# Patient Record
Sex: MALE | Race: WHITE | NOT HISPANIC OR LATINO | Employment: OTHER | URBAN - NONMETROPOLITAN AREA
[De-identification: names, ages, dates, MRNs, and addresses within clinical notes are randomized per-mention and may not be internally consistent; named-entity substitution may affect disease eponyms.]

---

## 2017-05-25 ENCOUNTER — TRANSCRIBE ORDERS (OUTPATIENT)
Dept: GENERAL RADIOLOGY | Facility: HOSPITAL | Age: 27
End: 2017-05-25

## 2017-05-25 DIAGNOSIS — R13.10 DYSPHAGIA, UNSPECIFIED TYPE: Primary | ICD-10-CM

## 2017-06-01 ENCOUNTER — TRANSCRIBE ORDERS (OUTPATIENT)
Dept: GENERAL RADIOLOGY | Facility: HOSPITAL | Age: 27
End: 2017-06-01

## 2017-06-08 ENCOUNTER — LAB REQUISITION (OUTPATIENT)
Dept: LAB | Facility: HOSPITAL | Age: 27
End: 2017-06-08

## 2017-06-08 DIAGNOSIS — N39.0 URINARY TRACT INFECTION: ICD-10-CM

## 2017-06-08 LAB
BACTERIA UR QL AUTO: ABNORMAL /HPF
BILIRUB UR QL STRIP: NEGATIVE
CLARITY UR: CLEAR
COLOR UR: YELLOW
GLUCOSE UR STRIP-MCNC: ABNORMAL MG/DL
HGB UR QL STRIP.AUTO: NEGATIVE
HYALINE CASTS UR QL AUTO: ABNORMAL /LPF
KETONES UR QL STRIP: ABNORMAL
LEUKOCYTE ESTERASE UR QL STRIP.AUTO: NEGATIVE
NITRITE UR QL STRIP: NEGATIVE
PH UR STRIP.AUTO: 7 [PH] (ref 5–8)
PROT UR QL STRIP: NEGATIVE
RBC # UR: ABNORMAL /HPF
REF LAB TEST METHOD: ABNORMAL
SP GR UR STRIP: 1.01 (ref 1–1.03)
SQUAMOUS #/AREA URNS HPF: ABNORMAL /HPF
UROBILINOGEN UR QL STRIP: ABNORMAL
WBC UR QL AUTO: ABNORMAL /HPF

## 2017-06-08 PROCEDURE — 81001 URINALYSIS AUTO W/SCOPE: CPT | Performed by: INTERNAL MEDICINE

## 2017-06-08 PROCEDURE — 87086 URINE CULTURE/COLONY COUNT: CPT | Performed by: INTERNAL MEDICINE

## 2017-06-10 LAB — BACTERIA SPEC AEROBE CULT: NO GROWTH

## 2017-06-19 ENCOUNTER — HOSPITAL ENCOUNTER (OUTPATIENT)
Dept: GENERAL RADIOLOGY | Facility: HOSPITAL | Age: 27
Discharge: HOME | End: 2017-06-19
Admitting: INTERNAL MEDICINE

## 2017-06-19 ENCOUNTER — HOSPITAL ENCOUNTER (EMERGENCY)
Facility: HOSPITAL | Age: 27
Discharge: HOME | End: 2017-06-19
Attending: EMERGENCY MEDICINE | Admitting: EMERGENCY MEDICINE

## 2017-06-19 VITALS
SYSTOLIC BLOOD PRESSURE: 125 MMHG | DIASTOLIC BLOOD PRESSURE: 80 MMHG | RESPIRATION RATE: 18 BRPM | WEIGHT: 128 LBS | HEIGHT: 71 IN | OXYGEN SATURATION: 96 % | HEART RATE: 101 BPM | TEMPERATURE: 98.1 F | BODY MASS INDEX: 17.92 KG/M2

## 2017-06-19 DIAGNOSIS — K40.90 UNILATERAL INGUINAL HERNIA WITHOUT OBSTRUCTION OR GANGRENE, RECURRENCE NOT SPECIFIED: ICD-10-CM

## 2017-06-19 DIAGNOSIS — R13.10 DYSPHAGIA, UNSPECIFIED TYPE: ICD-10-CM

## 2017-06-19 DIAGNOSIS — L02.92 FURUNCLE: Primary | ICD-10-CM

## 2017-06-19 PROCEDURE — 74230 X-RAY XM SWLNG FUNCJ C+: CPT

## 2017-06-19 PROCEDURE — 92611 MOTION FLUOROSCOPY/SWALLOW: CPT

## 2017-06-19 PROCEDURE — 99283 EMERGENCY DEPT VISIT LOW MDM: CPT

## 2017-06-19 RX ORDER — CITALOPRAM 20 MG/1
20 TABLET ORAL DAILY
COMMUNITY
End: 2019-08-07 | Stop reason: SDUPTHER

## 2017-06-19 RX ORDER — BACLOFEN 20 MG/1
20 TABLET ORAL 2 TIMES DAILY
COMMUNITY
End: 2017-06-22

## 2017-06-19 RX ORDER — CHOLECALCIFEROL (VITAMIN D3) 125 MCG
5 CAPSULE ORAL NIGHTLY
COMMUNITY
End: 2019-06-11

## 2017-06-19 RX ORDER — DANTROLENE SODIUM 100 MG/1
25 CAPSULE ORAL 2 TIMES DAILY
COMMUNITY
End: 2019-08-07

## 2017-06-19 RX ORDER — PROPRANOLOL HYDROCHLORIDE 20 MG/1
20 TABLET ORAL 3 TIMES DAILY
COMMUNITY
End: 2019-08-07 | Stop reason: SDUPTHER

## 2017-06-19 RX ORDER — GABAPENTIN 100 MG/1
100 CAPSULE ORAL 3 TIMES DAILY
COMMUNITY
End: 2017-07-20 | Stop reason: SDUPTHER

## 2017-06-19 NOTE — ED PROVIDER NOTES
Subjective   History of Present Illness   26M w/ prior anoxic brain injury on G-tube, speech difficulty bib his brother who found wound on L-central chest wall w/ mild erythema and drainage of white discharge. Denies fever, chills, n/v, hx of MRSA.     Brother also worried about swelling above R testicle found incidentally in shower. Normal bowel movements.    Review of Systems   Skin: Positive for wound.   All other systems reviewed and are negative.      Past Medical History:   Diagnosis Date   • Diabetes mellitus    • G tube feedings        Allergies   Allergen Reactions   • Penicillins Other (See Comments)       Past Surgical History:   Procedure Laterality Date   • KNEE SURGERY      RIGHT       History reviewed. No pertinent family history.    Social History     Social History   • Marital status: Single     Spouse name: N/A   • Number of children: N/A   • Years of education: N/A     Social History Main Topics   • Smoking status: Never Smoker   • Smokeless tobacco: None   • Alcohol use No   • Drug use: No   • Sexual activity: Not Asked     Other Topics Concern   • None     Social History Narrative   • None           Objective   Physical Exam   Constitutional: He appears well-developed. No distress.   Thin appearing   HENT:   Head: Normocephalic.   Mouth/Throat: Oropharynx is clear and moist.   Eyes: No scleral icterus.   Neck: Neck supple. No tracheal deviation present.   Cardiovascular: Normal rate, regular rhythm, normal heart sounds and intact distal pulses.  Exam reveals no gallop and no friction rub.    No murmur heard.  Pulmonary/Chest: Effort normal and breath sounds normal. No stridor. No respiratory distress. He has no wheezes. He has no rales. He exhibits no tenderness.   Abdominal: Soft. He exhibits no distension and no mass. There is no tenderness. There is no rebound and no guarding. A hernia (R-inguinal, easily reducible) is present.   G-tube in place LUQ   Musculoskeletal: Normal range of motion.  He exhibits no deformity.   Neurological: He is alert.   Mute during my interview   Skin: Skin is warm and dry. No rash noted. He is not diaphoretic. No erythema. No pallor.   2mm diameter wound L-central chest w/ drainage of pus. Minimal surrounding erythema. No induration. Pus expressed from wound at bedside.    Nursing note and vitals reviewed.      Procedures         ED Course  ED Course                  MDM   26-year-old male here with what appears to be a furuncle on the left anterior chest wall.  Drained at bedside.  Also with right inguinal hernia easily reducible.  Discussed the risks and benefits of antibiotic use for this forearm call, however, given that patient does have a history of C. difficile and has no risk factors for MRSA at the time, will defer this at the patient's brother's request.  Discussed the risks of possible further infection and signs and symptoms of this.  Also discussed the signs and symptoms of complications from his hernia and the importance of follow-up with a surgeon to discuss possible repair.  We'll discharge home with strict return to care precautions and recommendation to soak this wound twice daily.    Final diagnoses:   Furuncle   Unilateral inguinal hernia without obstruction or gangrene, recurrence not specified            Edis Castañeda MD  06/19/17 1499

## 2017-06-19 NOTE — MBS/VFSS/FEES
Outpatient Speech Language Pathology   Adult Swallow Modified Barium Swallow Study    [facility]     Patient Name: Stephen Delgado  : 1990  MRN: [id number]  Today's Date: 2017         Visit Date: 2017   There is no problem list on file for this patient.       Past Medical History:   Diagnosis Date   • Diabetes mellitus    • G tube feedings         Past Surgical History:   Procedure Laterality Date   • KNEE SURGERY      RIGHT         Visit Dx:     ICD-10-CM ICD-9-CM   1. Dysphagia, unspecified type R13.10 787.20                   Adult Dysphagia - 17 1005     Adult Dysphagia Background    Patient Description of Complaint unable to report; pt.'s brother reported exam recommended to see if pt. safe for diet upgragde from PEG as sole means of nutrition/hydration  -TM    Date of Onset 2017  -TM    Symptoms Reported by Patient Other (comment)   significant medical history affecting swallow safety  -TM    Patient Report of Functional Impact pt.'s brother reported that pt. has interest in po intake  -TM    History Pertinent to Diagnosis diabetic coma in ) per pt.'s brother   -TM    Date of Onset 2017  -TM    Diet Level (Liquids) Water/ice between meals   small amounts  -TM    Non-Oral Feeding Gastrostomy Tube  -TM    Oral Mech    Position During Evaluation Upright  -TM    Anatomy/Physiology WNL Patient demonstrates anatomy that is WNL   significant oral impairment of jaw, lips, tongue  -TM    Dentition Patient has own teeth  -TM    Oral Health Oral cavity is clean  -TM    Awareness/Control of Secretions Patient swallows saliva  -TM    Foods/Liquids Presented    Method of Administration Fed by examiner  -TM    Fed By Examiner    Consistency Thin;Honey;Pudding  -TM    Response Other (comment)   coughing prior, during, & after po trials  -TM    Strategies Attempted Chin Tuck   ineffective due to cognitive deficits  -TM    Response to Strategies Unsuccessful  -TM    Oral Phase  Impaired Physiology Observed    Oral Phase Other (comment)   decreased strength jaw, lips, & tongue  -TM    Pharyngeal Symptoms --   decreased laryngeal elev., tongue base; deep penet. to vf's  -TM    Summary Comments of Clinical Exam decreased jaw strenth, labial strength, and lingual strength & ROM affecting bolus prep, bolus initiation, bolus transit time, and bolus clearance (resulting in residue post swallows) from oral cavity; decreased laryngeal elevation w/mary kate aspiration during the swallow and pyriform stasis post swallows w/all consistencies trialed, decreased tongue base retraction w/moderate stasis post swallows in valleculae w/all consistencies, and aspiration post swallow w/honey-thick due to loss of stasis from valleculae.  He was unable to clear penetrated liq from the laryngeal vestibule due to weakness of cough.  Pt. unsafe for continued trials.  Suspect aspiration from secretions as pt. exhibited frequent cough prior to po trials and throughout exam.  Pt. recommended to be NPO - unsafe for any po intake at this time.  Pt. should continue w/speech therapy for strengthening for potential for po intake.   -TM    VFSS Exam    Study Completed By SLP and Radiology PA (comment)  -TM    Textures Presented Thin;Honey;Pudding  -TM    Position During Study/Views Obtained Upright;Lateral View  -TM    Physiologic Impairments Noted on VFSS    Physiologic Impairments Noted on VFSS Did not assess esophageal phase;Other (comment)   severe oral and pharyngeal phases dysphagia  -TM    Reduced Tongue Base Retraction all trials  -TM    Increased Stage Transition Duration (Delay) all trials  -TM    Reduced Laryngeal Elevation all trials  -TM    Reduced Pharyngeal Contraction all trials  -TM    Reduced Closure at Entrance to Airway honey and thin  -TM    Reduced Closure Level of Vocal Folds thin  -TM    Symptoms    Aspiration X   thin  -TM    Aspiration During With these consistencies (comment)   thin  -TM     Aspiration After With these consistencies (comment)   honey  -TM    Residue Noted X   all consistencies noted  -TM    Valleculae With these consistencies (comment)   thin, honey, pudding (all trialed)  -TM    Pyriform Sinuses With these consistencies (comment)   thin, honey, pudding (all trialed)  -TM    Esophageal Phase    Per Radiology, the esophageal phase: Did not assess  -TM    SLP Communication to Radiology    Severity Level of Dysphagia severe dysphagia;oral dysfunction;pharyngeal dysfunction  -TM    Consistencies Aspirated/Penetrated aspirated:;thin;honey  -TM    Results/Recs/Barriers/Education for Dysphagia    Learning Motivation moderate  -TM    Education/Learning Comments difficult to assess; educated pt. and his brother/caregiver  -TM    Clinical Impression: Swallowing Severe:;oropharyngeal phase dysphagia  -TM    Impact on Function risk for aspiration;risk for pneumonia  -TM    Recommendations for Diet/Nutirition NPO  -TM    Recommendations dysphagia therapy to address swallowing deficits;continue therapy to address swallowing deficits;repeat instrumental before advancing diet  -TM      User Key  (r) = Recorded By, (t) = Taken By, (c) = Cosigned By    Initials Name Provider Type     Kimberly Garcia Speech and Language Pathologist                            OP SLP Education       06/19/17 1537    Education    Barriers to Learning No barriers identified  -TM    Education Provided Described results of evaluation;Family/caregivers expressed understanding of evaluation  -TM    Assessed Learning readiness  -TM    Learning Motivation Strong  -TM    Learning Method Explanation  -TM    Teaching Response Verbalized understanding  -TM      User Key  (r) = Recorded By, (t) = Taken By, (c) = Cosigned By    Initials Name Effective Dates    SAE Garcia 10/26/16 -                     OP SLP Assessment/Plan - 06/19/17 1005     SLP Assessment    Clinical Impression: Swallowing Severe:;oropharyngeal phase  dysphagia  -TM      User Key  (r) = Recorded By, (t) = Taken By, (c) = Cosigned By    Initials Name Provider Type    TM Kimberly Garcia Speech and Language Pathologist                    Time Calculation:   SLP Start Time: 1005    Therapy Charges for Today     Code Description Service Date Service Provider Modifiers Qty    [id number] HC ST MOTION FLUORO EVAL SWALLOW 6 6/19/2017 Kimberly Garcia GN 1                   Kimberly Garcia  6/19/2017

## 2017-06-22 ENCOUNTER — OFFICE VISIT (OUTPATIENT)
Dept: INTERNAL MEDICINE | Facility: CLINIC | Age: 27
End: 2017-06-22

## 2017-06-22 ENCOUNTER — HOSPITAL ENCOUNTER (OUTPATIENT)
Dept: GENERAL RADIOLOGY | Facility: HOSPITAL | Age: 27
Discharge: HOME | End: 2017-06-22
Attending: FAMILY MEDICINE | Admitting: FAMILY MEDICINE

## 2017-06-22 VITALS
BODY MASS INDEX: 15.63 KG/M2 | HEART RATE: 114 BPM | HEIGHT: 72 IN | SYSTOLIC BLOOD PRESSURE: 112 MMHG | DIASTOLIC BLOOD PRESSURE: 70 MMHG | RESPIRATION RATE: 12 BRPM | OXYGEN SATURATION: 96 % | WEIGHT: 115.4 LBS

## 2017-06-22 DIAGNOSIS — L98.9 SKIN LESION OF CHEST WALL: Primary | ICD-10-CM

## 2017-06-22 DIAGNOSIS — L60.9 FINGERNAIL ABNORMALITIES: ICD-10-CM

## 2017-06-22 DIAGNOSIS — Z93.1 G TUBE FEEDINGS (HCC): ICD-10-CM

## 2017-06-22 DIAGNOSIS — L98.9 SKIN LESION OF CHEST WALL: ICD-10-CM

## 2017-06-22 DIAGNOSIS — IMO0001 IDDM (INSULIN DEPENDENT DIABETES MELLITUS): ICD-10-CM

## 2017-06-22 DIAGNOSIS — G93.1 ANOXIC BRAIN DAMAGE (HCC): ICD-10-CM

## 2017-06-22 PROBLEM — R13.19 NEUROGENIC DYSPHAGIA: Status: ACTIVE | Noted: 2017-06-22

## 2017-06-22 PROBLEM — R63.6 UNDERWEIGHT: Status: ACTIVE | Noted: 2017-06-22

## 2017-06-22 PROBLEM — M24.532 CONTRACTURE OF LEFT WRIST: Status: ACTIVE | Noted: 2017-06-22

## 2017-06-22 PROCEDURE — 71020 HC CHEST PA AND LATERAL: CPT

## 2017-06-22 PROCEDURE — 99204 OFFICE O/P NEW MOD 45 MIN: CPT | Performed by: FAMILY MEDICINE

## 2017-06-22 RX ORDER — BACLOFEN 10 MG/1
10 TABLET ORAL 3 TIMES DAILY
COMMUNITY
Start: 2017-06-22 | End: 2019-08-07 | Stop reason: SDUPTHER

## 2017-06-22 RX ORDER — CLONIDINE HYDROCHLORIDE 0.1 MG/1
0.1 TABLET ORAL
COMMUNITY
End: 2019-08-07

## 2017-06-22 RX ORDER — BACLOFEN 20 MG/1
10 TABLET ORAL 3 TIMES DAILY
COMMUNITY
Start: 2017-06-22 | End: 2017-06-22

## 2017-06-22 RX ORDER — INSULIN GLARGINE 100 [IU]/ML
INJECTION, SOLUTION SUBCUTANEOUS DAILY
COMMUNITY
End: 2017-07-20

## 2017-06-22 NOTE — PROGRESS NOTES
Subjective   Stephen Delgado is a 26 y.o. male.     History of Present Illness   Speech is off right now due to muscle relaxer use, brother is historian.     Patient has a complicated medical history. He was diagnosed with type 1 diabetes at age 17 with glucose >1000 per brother. On March 15, 2017, patient went into a diabetic coma, coded. Had aspirated. He was in a coma for about 24 days. Woke up mentally fine despite bad EEG brother says; they were told he would be a vegetable. He is fed via g-tube, failed barium swallow this Monday. No worries regarding tube at this time, working well. He continues physical therapy.     He has a history of red knot on chest where he had CPR reports brother. It started having blood and pus coming out Monday morning. Brother bandaged it, took patient to his barium swallow, then to ER where he was told it was folliculitis. Brother now feels there's tissue coming out of it. Brother feels it's worsening despite keeping clean. He worries about a broken bone affecting this area.     Continues tube feeds via g-tube due to the dysphagia. No issues with tube, functions well. Home health helping with care.     He also has some discoloration to the tip of his finger/nail on left hand middle finger. Brother thinks it may be fungal from where patient keeps his fingers closed often. They use a sock balled up to help with contractures and he has braces.     Sugars seem to be well controlled per brother with current regimen of insulin. Not seeing endocrinology though as in the past in [place].       The following portions of the patient's history were reviewed and updated as appropriate: allergies, current medications, past family history, past medical history, past social history, past surgical history and problem list.    Review of Systems   Constitutional: Positive for fatigue and unexpected weight change (LOSS).   HENT: Positive for trouble swallowing.    Respiratory: Positive for cough.     Cardiovascular: Positive for chest pain.   Gastrointestinal: Positive for diarrhea.   Endocrine: Positive for polydipsia.   Musculoskeletal: Positive for gait problem.        Painful joints, muscle pain, limb pain, limb swelling, chronic pain.     Skin:        wound   Neurological: Positive for weakness.   All other systems reviewed and are negative.      Vitals:    06/22/17 1156   BP: 112/70   Pulse: 114   Resp: 12   SpO2: 96%       Objective   Physical Exam   Constitutional: He appears cachectic. He has a sickly appearance.   HENT:   Head: Normocephalic and atraumatic.   Eyes: EOM are normal. Pupils are equal, round, and reactive to light.   Neck:       Cardiovascular: Normal rate, regular rhythm and normal heart sounds.    Pulmonary/Chest: Effort normal and breath sounds normal.   Frequent throat clearing   Abdominal:       Musculoskeletal:   Left arm held in flexion at elbow and at wrist   Neurological: He is alert. Gait abnormal.   Patient does not speak to me but is somehow communicating to brother. No understandable speech. Able to walk but slow   Skin: Skin is warm. He is not diaphoretic. There is pallor.   Lesion as previously noted. Some discoloration of tip of nail on left hand middle finger nail with some white spots to nail consistent with fungal presence   Psychiatric: He is not aggressive and not combative. He is noncommunicative.   Nursing note and vitals reviewed.       I have reviewed notes from  including his admission H&P from 3/15/17 and the discharge summary from 4/20/17.     EEG 4/4/17: background was diffusely attenuated and slow. May suggest moderately severe neuronal dysfunction. No significant reactivity was noted on stimulation. No interictal epileptiform activity noted. No ongoing seizure noted. EEG suggestive of encephalopathy.    MR Head without IV contrast 3/28/17: Fairly extensive cortical restricted diffusion and T2 hyperintensity involving the watershed regions of the posterior  cerebral hemispheres.  This is most likely due to anoxic or hypotensive/hypoperfusion injury.  Generalized brain parenchymal volume loss, greater than expected for patient age.    CT head without IV contrast 5/18/17: Sequela hypoxic ischemic injury with chronic bilateral corpus striata infarcts and generalized cerebral atrophy.    Patient was also admitted 1/1/17 to 1/6/17 at  under care of Dr. Noguera for DKA, alcohol abuse complicated by withdrawal, acute kidney injury, acute encephalopathy per discharge summary.     Assessment/Plan   Stephen was seen today for establish care, diabetes, med management, med refill and s/p coma.    Diagnoses and all orders for this visit:    Skin lesion of chest wall  -     XR Chest PA & Lateral; Future  -     Ambulatory Referral to General Surgery    IDDM (insulin dependent diabetes mellitus)--no refills needed at this time    G tube feedings--continue home health    Fingernail abnormalities--keep hand clean and as dry as possible, can try over the counter antifungal creams    Anoxic brain damage--continue therapy               Krysta Ordonez MD

## 2017-06-24 ENCOUNTER — TELEPHONE (OUTPATIENT)
Dept: INTERNAL MEDICINE | Facility: CLINIC | Age: 27
End: 2017-06-24

## 2017-06-24 NOTE — TELEPHONE ENCOUNTER
Home health nurse called re: hyperglycemia. Glucose was read as high earlier today, after doing 4 units was 479 or so when she called. I had her increase his sliding scale by 1 unit on each level and asked her to give him 5 units now x once then resume normal schedule.

## 2017-06-30 ENCOUNTER — OFFICE VISIT (OUTPATIENT)
Dept: SURGERY | Facility: CLINIC | Age: 27
End: 2017-06-30

## 2017-06-30 ENCOUNTER — TELEPHONE (OUTPATIENT)
Dept: INTERNAL MEDICINE | Facility: CLINIC | Age: 27
End: 2017-06-30

## 2017-06-30 VITALS
BODY MASS INDEX: 16.1 KG/M2 | SYSTOLIC BLOOD PRESSURE: 100 MMHG | OXYGEN SATURATION: 99 % | HEIGHT: 71 IN | TEMPERATURE: 98.2 F | DIASTOLIC BLOOD PRESSURE: 68 MMHG | WEIGHT: 115 LBS | HEART RATE: 108 BPM | RESPIRATION RATE: 16 BRPM

## 2017-06-30 DIAGNOSIS — L98.9 SKIN LESION: Primary | ICD-10-CM

## 2017-06-30 PROCEDURE — 99241 PR OFFICE CONSULTATION NEW/ESTAB PATIENT 15 MIN: CPT | Performed by: SURGERY

## 2017-06-30 PROCEDURE — 11422 EXC H-F-NK-SP B9+MARG 1.1-2: CPT | Performed by: SURGERY

## 2017-06-30 RX ORDER — DANTROLENE SODIUM 25 MG/1
CAPSULE ORAL
COMMUNITY
Start: 2017-06-14 | End: 2017-06-30

## 2017-06-30 RX ORDER — LANOLIN ALCOHOL/MO/W.PET/CERES
CREAM (GRAM) TOPICAL
COMMUNITY
Start: 2017-06-13 | End: 2017-06-30

## 2017-06-30 RX ORDER — PROPRANOLOL HYDROCHLORIDE 10 MG/1
TABLET ORAL
COMMUNITY
Start: 2017-05-18 | End: 2017-06-30

## 2017-06-30 RX ORDER — CITALOPRAM 10 MG/1
TABLET ORAL
COMMUNITY
Start: 2017-06-13 | End: 2017-06-30

## 2017-07-09 ENCOUNTER — HOSPITAL ENCOUNTER (EMERGENCY)
Facility: HOSPITAL | Age: 27
Discharge: HOME | End: 2017-07-09
Attending: EMERGENCY MEDICINE | Admitting: EMERGENCY MEDICINE

## 2017-07-09 ENCOUNTER — APPOINTMENT (OUTPATIENT)
Dept: CT IMAGING | Facility: HOSPITAL | Age: 27
End: 2017-07-09

## 2017-07-09 VITALS
OXYGEN SATURATION: 96 % | TEMPERATURE: 97.4 F | HEIGHT: 71 IN | BODY MASS INDEX: 17.5 KG/M2 | DIASTOLIC BLOOD PRESSURE: 94 MMHG | RESPIRATION RATE: 20 BRPM | HEART RATE: 84 BPM | SYSTOLIC BLOOD PRESSURE: 139 MMHG | WEIGHT: 125 LBS

## 2017-07-09 DIAGNOSIS — R13.10 DYSPHAGIA, UNSPECIFIED TYPE: ICD-10-CM

## 2017-07-09 DIAGNOSIS — J39.8 TRACHEAL/BRONCHIAL DISEASE: Primary | ICD-10-CM

## 2017-07-09 DIAGNOSIS — J98.09 TRACHEAL/BRONCHIAL DISEASE: Primary | ICD-10-CM

## 2017-07-09 PROCEDURE — 99284 EMERGENCY DEPT VISIT MOD MDM: CPT

## 2017-07-09 PROCEDURE — 71250 CT THORAX DX C-: CPT

## 2017-07-09 RX ORDER — SODIUM CHLORIDE 0.9 % (FLUSH) 0.9 %
10 SYRINGE (ML) INJECTION AS NEEDED
Status: DISCONTINUED | OUTPATIENT
Start: 2017-07-09 | End: 2017-07-10 | Stop reason: HOSPADM

## 2017-07-09 RX ORDER — ALBUTEROL SULFATE 2.5 MG/3ML
2.5 SOLUTION RESPIRATORY (INHALATION) ONCE
Status: COMPLETED | OUTPATIENT
Start: 2017-07-09 | End: 2017-07-09

## 2017-07-09 RX ADMIN — ALBUTEROL SULFATE 2.5 MG: 2.5 SOLUTION RESPIRATORY (INHALATION) at 22:13

## 2017-07-10 ENCOUNTER — TELEPHONE (OUTPATIENT)
Dept: INTERNAL MEDICINE | Facility: CLINIC | Age: 27
End: 2017-07-10

## 2017-07-10 NOTE — ED NOTES
Pt's brother requests pt to be suctioned. Informed IBAN Morales. New order to suction pt.     Louise Charles RN  07/09/17 6568

## 2017-07-10 NOTE — ED PROVIDER NOTES
Subjective   HPI Comments: 26-year-old male who has a history of anoxic brain injury who had a prolonged hospital stay at [facility] and subsequently went to cardiac rehabilitation center presents with concern for aspiration and shortness of breath.  He currently has a PEG and was recently decannulated from the trach and felt a dysphasia barium swallow test several weeks ago, however he was cleared at the bedside by speech therapy and had a soft diet several days ago.  His brother stated that he began to have problems with swallowing and coughing so he stopped the soft diet 2 days ago.  He reported that he continues to have congestion cough and sounds like he may have aspirated today.    Patient is a 26 y.o. male presenting with shortness of breath.   History provided by:  Relative   used: No    Shortness of Breath   Severity:  Moderate  Onset quality:  Sudden  Timing:  Intermittent  Progression:  Worsening  Chronicity:  New  Context comment:  Possible aspiration  Relieved by:  Nothing  Worsened by:  Coughing and eating  Ineffective treatments:  None tried  Associated symptoms: cough        Review of Systems   Respiratory: Positive for cough and shortness of breath.    All other systems reviewed and are negative.      Past Medical History:   Diagnosis Date   • Alcohol dependence in remission    • Anoxic brain damage    • Cardiac arrest due to other underlying condition 03/15/2017   • DKA (diabetic ketoacidosis)    • G tube feedings    • Hirschsprung's disease    • Opioid abuse    • Shock liver 03/15/2017   • Type 1 diabetes mellitus on insulin therapy        Allergies   Allergen Reactions   • Penicillins Other (See Comments)       Past Surgical History:   Procedure Laterality Date   • COLOSTOMY     • KNEE ACL RECONSTRUCTION Right    • TRACHEOSTOMY  03/2017       Family History   Problem Relation Age of Onset   • Arthritis Maternal Aunt    • Diabetes Maternal Aunt    • Heart  attack Maternal Grandfather    • Diabetes Cousin        Social History     Social History   • Marital status: Single     Spouse name: N/A   • Number of children: N/A   • Years of education: N/A     Social History Main Topics   • Smoking status: Never Smoker   • Smokeless tobacco: None   • Alcohol use No      Comment: former   • Drug use: No      Comment: former, opiates   • Sexual activity: Not Asked     Other Topics Concern   • None     Social History Narrative           Objective   Physical Exam   Constitutional: He appears well-developed and well-nourished.   HENT:   Head: Normocephalic and atraumatic.   Left Ear: External ear normal.   Eyes: EOM are normal. Pupils are equal, round, and reactive to light.   Neck: Normal range of motion.   Cardiovascular: Normal rate and regular rhythm.    Pulmonary/Chest: Effort normal. He has rales.   Abdominal: Soft. Bowel sounds are normal.   Musculoskeletal: Normal range of motion.   Neurological: He is alert.   Skin: Skin is warm and dry.   Psychiatric: He has a normal mood and affect.   Nursing note and vitals reviewed.      Procedures         ED Course  ED Course                  MDM    Final diagnoses:   Tracheal/bronchial disease   Dysphagia, unspecified type            Andrew Vargas Jr., PA-C  07/09/17 [id number]

## 2017-07-12 ENCOUNTER — TELEPHONE (OUTPATIENT)
Dept: INTERNAL MEDICINE | Facility: CLINIC | Age: 27
End: 2017-07-12

## 2017-07-12 DIAGNOSIS — G93.1 ANOXIC BRAIN DAMAGE (HCC): ICD-10-CM

## 2017-07-12 DIAGNOSIS — M24.532 CONTRACTURE OF LEFT WRIST: ICD-10-CM

## 2017-07-12 DIAGNOSIS — R13.19 NEUROGENIC DYSPHAGIA: Primary | ICD-10-CM

## 2017-07-12 NOTE — TELEPHONE ENCOUNTER
PATIENT WILL BE DISCHARGED FROM HOME HEALTH PT THIS WEEK. THE PATIENT WOULD LIKE TO CONTINUE HIS PT WITH [facility]. HE NEEDS A REFERRAL FOR PT, MUST SAY EVALUATE  AND TREAT. ONCE REFERRAL IS IN, IT NEEDS TO BE FAXED TO SCOTTIE AT [facility] ([phone]).

## 2017-07-13 ENCOUNTER — OFFICE VISIT (OUTPATIENT)
Dept: SURGERY | Facility: CLINIC | Age: 27
End: 2017-07-13

## 2017-07-13 VITALS
DIASTOLIC BLOOD PRESSURE: 78 MMHG | HEART RATE: 76 BPM | SYSTOLIC BLOOD PRESSURE: 120 MMHG | WEIGHT: 125 LBS | TEMPERATURE: 97.6 F | BODY MASS INDEX: 17.5 KG/M2 | OXYGEN SATURATION: 94 % | HEIGHT: 71 IN

## 2017-07-13 DIAGNOSIS — T14.8XXA WOUND OF SKIN: Primary | ICD-10-CM

## 2017-07-13 PROCEDURE — 99212 OFFICE O/P EST SF 10 MIN: CPT | Performed by: SURGERY

## 2017-07-13 NOTE — PROGRESS NOTES
Patient: Stephen Delgado    YOB: 1990    Date: 07/13/2017    Primary Care Provider: Krysta Ordonez MD    Reason for Consultation: Follow-up lesion excision    Chief Complaint:   Chief Complaint   Patient presents with   • Follow-up     chest lesion       History of present illness:  I saw the patient in the office today as a followup from their recent lesion excision, the pathology report did show negative for specific microrganisms .  They state that they have done well and are having no problems.      Vital Signs   Temp:  [97.6 °F (36.4 °C)] 97.6 °F (36.4 °C)  Heart Rate:  [76] 76  BP: (120)/(78) 120/78    Physical Exam:   General Appearance:    Alert, cooperative, in no acute distress.  Skin with decreased granulation at the chest site.  Still some mounding however.  Silver nitrate was used to control this.         Assessment / Plan:    1. Wound of skin        Injury side with granulation tissue.  It is much improved.  Follow-up in 2 weeks.  May need another application or 2    Electronically signed by El Wahl MD  07/13/17

## 2017-07-14 NOTE — TELEPHONE ENCOUNTER
KADEEM WITH [facility] NOTIFIED, ORDER FAXED TO SCOTTIE AT [facility].

## 2017-07-17 ENCOUNTER — TELEPHONE (OUTPATIENT)
Dept: INTERNAL MEDICINE | Facility: CLINIC | Age: 27
End: 2017-07-17

## 2017-07-17 NOTE — TELEPHONE ENCOUNTER
SHANICE MCNEAL/[facility] CALLED AND LEFT VOICEMAIL REQUESTING DISCHARGE ORDER. MET ALL NURSING GOALS, BLOOD SUGAR UNDER CONTROL, AND BROTHER EXCELLENT CARE OF PATIENT'S NEEDS.

## 2017-07-20 ENCOUNTER — OFFICE VISIT (OUTPATIENT)
Dept: INTERNAL MEDICINE | Facility: CLINIC | Age: 27
End: 2017-07-20

## 2017-07-20 VITALS
HEIGHT: 71 IN | DIASTOLIC BLOOD PRESSURE: 70 MMHG | SYSTOLIC BLOOD PRESSURE: 118 MMHG | TEMPERATURE: 98.6 F | OXYGEN SATURATION: 99 % | HEART RATE: 101 BPM | BODY MASS INDEX: 16.38 KG/M2 | WEIGHT: 117 LBS

## 2017-07-20 DIAGNOSIS — E10.8 TYPE 1 DIABETES MELLITUS WITH COMPLICATION (HCC): Primary | ICD-10-CM

## 2017-07-20 DIAGNOSIS — R13.19 NEUROGENIC DYSPHAGIA: ICD-10-CM

## 2017-07-20 DIAGNOSIS — L81.9 MOTTLED SKIN: ICD-10-CM

## 2017-07-20 DIAGNOSIS — G93.1 ANOXIC BRAIN DAMAGE (HCC): ICD-10-CM

## 2017-07-20 DIAGNOSIS — Z74.1 REQUIRES DAILY ASSISTANCE FOR ACTIVITIES OF DAILY LIVING (ADL) AND COMFORT NEEDS: ICD-10-CM

## 2017-07-20 LAB — HBA1C MFR BLD: 9 %

## 2017-07-20 PROCEDURE — 99214 OFFICE O/P EST MOD 30 MIN: CPT | Performed by: FAMILY MEDICINE

## 2017-07-20 PROCEDURE — 83036 HEMOGLOBIN GLYCOSYLATED A1C: CPT | Performed by: FAMILY MEDICINE

## 2017-07-20 RX ORDER — INSULIN GLARGINE 100 [IU]/ML
24 INJECTION, SOLUTION SUBCUTANEOUS DAILY
COMMUNITY
Start: 2017-07-20 | End: 2019-06-11

## 2017-07-20 RX ORDER — GABAPENTIN 100 MG/1
100 CAPSULE ORAL 3 TIMES DAILY
Qty: 90 CAPSULE | Refills: 2 | Status: SHIPPED | OUTPATIENT
Start: 2017-07-20 | End: 2019-06-11

## 2017-07-21 ENCOUNTER — TELEPHONE (OUTPATIENT)
Dept: INTERNAL MEDICINE | Facility: CLINIC | Age: 27
End: 2017-07-21

## 2017-07-21 NOTE — TELEPHONE ENCOUNTER
SHANICE HAD LEFT A VOICEMAIL REQUESTING VERBAL D/C ORDER TO D/C SPEECH THERAPY FOR PATIENT. MADE CONTACT WITH SHANICE, VERBAL GIVEN.

## 2017-07-25 ENCOUNTER — TELEPHONE (OUTPATIENT)
Dept: SURGERY | Facility: CLINIC | Age: 27
End: 2017-07-25

## 2017-08-03 ENCOUNTER — TELEPHONE (OUTPATIENT)
Dept: INTERNAL MEDICINE | Facility: CLINIC | Age: 27
End: 2017-08-03

## 2017-08-03 NOTE — TELEPHONE ENCOUNTER
CALLED PATIENT'S GUARDIAN/BROTHER (DARIUS MARK) TO LET HIM KNOW THAT [facility] HAD SENT US A FAX STATING THEY WERE TRYING TO CONTACT HIM TO SCHEDULE AN APPT. HE WAS GIVEN THE PHONE NUMBER TO [facility] AND HE ADVISED HE WOULD CALL THEM.    DARIUS ALSO ADVISED THAT DR ETIENNE WILL NO LONGER BE HIS BROTHER'S PCP.    DR ETIENNE WAS PRESENT AT MY DESK WHEN I WAS TALKING TO THE PATIENT'S BROTHER, SO SHE IS AWARE.

## 2017-08-22 ENCOUNTER — TELEPHONE (OUTPATIENT)
Dept: INTERNAL MEDICINE | Facility: CLINIC | Age: 27
End: 2017-08-22

## 2017-08-22 NOTE — TELEPHONE ENCOUNTER
SHANICE MCNEAL/[facility] CALLED. SHE NEEDS VERBAL ORDER FOR VISITS STARTED 06/22. SHE NOTICED THAT A VERBAL ORDER WAS NOT GIVEN FOR THE CARE AT THAT TIME. SHE IS CLOSING OUT HIS RECORD. SHE APOLOGIZED THAT THEY DID NOT GET THE VERBAL ORDER FOR THESE SERVICES. SERVICES FOR NUTRITIONAL EDUCATION, DEPRESSION, PAIN, VITALS. OK TO GIVE VERBAL?

## 2017-08-23 NOTE — TELEPHONE ENCOUNTER
ATTEMPTED TO CALL SHANICE WITH [facility] BACK, GOT HER VOICEMAIL. LEFT DETAILED MESSAGE GIVING VERBAL ORDER THAT SHE NEEDED. ASK HER TO CALL BACK IF SHE NEEDS ANYTHING ELSE.

## 2019-02-09 ENCOUNTER — HOSPITAL ENCOUNTER (EMERGENCY)
Facility: HOSPITAL | Age: 29
Discharge: HOME | End: 2019-02-09
Attending: EMERGENCY MEDICINE | Admitting: EMERGENCY MEDICINE

## 2019-02-09 VITALS
TEMPERATURE: 97.9 F | DIASTOLIC BLOOD PRESSURE: 88 MMHG | BODY MASS INDEX: 17.18 KG/M2 | HEIGHT: 70 IN | SYSTOLIC BLOOD PRESSURE: 140 MMHG | OXYGEN SATURATION: 100 % | HEART RATE: 81 BPM | WEIGHT: 120 LBS | RESPIRATION RATE: 18 BRPM

## 2019-02-09 DIAGNOSIS — E10.9 TYPE 1 DIABETES MELLITUS WITHOUT COMPLICATION (HCC): Primary | ICD-10-CM

## 2019-02-09 LAB
ALBUMIN SERPL-MCNC: 4.7 G/DL (ref 3.5–5)
ALBUMIN/GLOB SERPL: 1.7 G/DL (ref 1.5–2.5)
ALP SERPL-CCNC: 75 U/L (ref 40–129)
ALT SERPL W P-5'-P-CCNC: 20 U/L (ref 10–44)
ANION GAP SERPL CALCULATED.3IONS-SCNC: 8.7 MMOL/L (ref 3.6–11.2)
AST SERPL-CCNC: 24 U/L (ref 10–34)
BASOPHILS # BLD AUTO: 0.05 10*3/MM3 (ref 0–0.3)
BASOPHILS NFR BLD AUTO: 1 % (ref 0–2)
BILIRUB SERPL-MCNC: 1.6 MG/DL (ref 0.2–1.8)
BILIRUB UR QL STRIP: NEGATIVE
BUN BLD-MCNC: 13 MG/DL (ref 7–21)
BUN/CREAT SERPL: 13.4 (ref 7–25)
CALCIUM SPEC-SCNC: 9.5 MG/DL (ref 7.7–10)
CHLORIDE SERPL-SCNC: 101 MMOL/L (ref 99–112)
CLARITY UR: CLEAR
CO2 SERPL-SCNC: 29.3 MMOL/L (ref 24.3–31.9)
COLOR UR: YELLOW
CREAT BLD-MCNC: 0.97 MG/DL (ref 0.43–1.29)
DEPRECATED RDW RBC AUTO: 42.9 FL (ref 37–54)
EOSINOPHIL # BLD AUTO: 0.14 10*3/MM3 (ref 0–0.7)
EOSINOPHIL NFR BLD AUTO: 2.8 % (ref 0–5)
ERYTHROCYTE [DISTWIDTH] IN BLOOD BY AUTOMATED COUNT: 13.3 % (ref 11.5–14.5)
GFR SERPL CREATININE-BSD FRML MDRD: 92 ML/MIN/1.73
GLOBULIN UR ELPH-MCNC: 2.7 GM/DL
GLUCOSE BLD-MCNC: 148 MG/DL (ref 70–110)
GLUCOSE BLDC GLUCOMTR-MCNC: 80 MG/DL (ref 70–130)
GLUCOSE UR STRIP-MCNC: NEGATIVE MG/DL
HCT VFR BLD AUTO: 45.3 % (ref 42–52)
HGB BLD-MCNC: 15.8 G/DL (ref 14–18)
HGB UR QL STRIP.AUTO: NEGATIVE
IMM GRANULOCYTES # BLD AUTO: 0.02 10*3/MM3 (ref 0–0.03)
IMM GRANULOCYTES NFR BLD AUTO: 0.4 % (ref 0–0.5)
KETONES UR QL STRIP: NEGATIVE
LEUKOCYTE ESTERASE UR QL STRIP.AUTO: NEGATIVE
LYMPHOCYTES # BLD AUTO: 1.55 10*3/MM3 (ref 1–3)
LYMPHOCYTES NFR BLD AUTO: 30.6 % (ref 21–51)
MCH RBC QN AUTO: 30.9 PG (ref 27–33)
MCHC RBC AUTO-ENTMCNC: 34.9 G/DL (ref 33–37)
MCV RBC AUTO: 88.6 FL (ref 80–94)
MONOCYTES # BLD AUTO: 0.41 10*3/MM3 (ref 0.1–0.9)
MONOCYTES NFR BLD AUTO: 8.1 % (ref 0–10)
NEUTROPHILS # BLD AUTO: 2.89 10*3/MM3 (ref 1.4–6.5)
NEUTROPHILS NFR BLD AUTO: 57.1 % (ref 30–70)
NITRITE UR QL STRIP: NEGATIVE
OSMOLALITY SERPL CALC.SUM OF ELEC: 280.4 MOSM/KG (ref 273–305)
PH UR STRIP.AUTO: 5.5 [PH] (ref 5–8)
PLATELET # BLD AUTO: 255 10*3/MM3 (ref 130–400)
PMV BLD AUTO: 9.6 FL (ref 6–10)
POTASSIUM BLD-SCNC: 4 MMOL/L (ref 3.5–5.3)
PROT SERPL-MCNC: 7.4 G/DL (ref 6–8)
PROT UR QL STRIP: NEGATIVE
RBC # BLD AUTO: 5.11 10*6/MM3 (ref 4.7–6.1)
SODIUM BLD-SCNC: 139 MMOL/L (ref 135–153)
SP GR UR STRIP: <=1.005 (ref 1–1.03)
UROBILINOGEN UR QL STRIP: NORMAL
WBC NRBC COR # BLD: 5.06 10*3/MM3 (ref 4.5–12.5)

## 2019-02-09 PROCEDURE — 80053 COMPREHEN METABOLIC PANEL: CPT | Performed by: EMERGENCY MEDICINE

## 2019-02-09 PROCEDURE — 82962 GLUCOSE BLOOD TEST: CPT

## 2019-02-09 PROCEDURE — 81003 URINALYSIS AUTO W/O SCOPE: CPT | Performed by: EMERGENCY MEDICINE

## 2019-02-09 PROCEDURE — 99284 EMERGENCY DEPT VISIT MOD MDM: CPT

## 2019-02-09 PROCEDURE — 85025 COMPLETE CBC W/AUTO DIFF WBC: CPT | Performed by: EMERGENCY MEDICINE

## 2019-02-09 PROCEDURE — 36415 COLL VENOUS BLD VENIPUNCTURE: CPT

## 2019-02-09 RX ORDER — SODIUM CHLORIDE 0.9 % (FLUSH) 0.9 %
10 SYRINGE (ML) INJECTION AS NEEDED
Status: DISCONTINUED | OUTPATIENT
Start: 2019-02-09 | End: 2019-02-09 | Stop reason: HOSPADM

## 2019-02-09 NOTE — ED PROVIDER NOTES
Subjective   Patient presents to ER via EMS for hypoglycemia        Hypoglycemia   Severity:  Mild  Onset quality:  Gradual  Timing:  Constant  Chronicity:  Recurrent  Diabetic status:  Controlled with insulin      Review of Systems   Constitutional: Positive for activity change and fatigue.   HENT: Negative.    Eyes: Negative.    Respiratory: Negative.    Gastrointestinal: Negative.    Endocrine: Negative.    Genitourinary: Negative.    Musculoskeletal: Negative.    Allergic/Immunologic: Negative.    Neurological: Negative.    Hematological: Negative.    Psychiatric/Behavioral: Negative.        Past Medical History:   Diagnosis Date   • Alcohol dependence in remission (CMS/[facility])    • Anoxic brain damage (CMS/[facility])    • Cardiac arrest due to other underlying condition (CMS/[facility]) 03/15/2017   • DKA (diabetic ketoacidosis) (CMS/[facility])    • G tube feedings (CMS/[facility])    • Hirschsprung's disease    • Opioid abuse (CMS/[facility])    • Shock liver 03/15/2017   • Type 1 diabetes mellitus on insulin therapy (CMS/[facility])        Allergies   Allergen Reactions   • Penicillins Other (See Comments)       Past Surgical History:   Procedure Laterality Date   • COLOSTOMY     • KNEE ACL RECONSTRUCTION Right    • TRACHEOSTOMY  03/2017       Family History   Problem Relation Age of Onset   • Arthritis Maternal Aunt    • Diabetes Maternal Aunt    • Heart attack Maternal Grandfather    • Diabetes Cousin        Social History     Socioeconomic History   • Marital status: Single     Spouse name: Not on file   • Number of children: Not on file   • Years of education: Not on file   • Highest education level: Not on file   Tobacco Use   • Smoking status: Never Smoker   Substance and Sexual Activity   • Alcohol use: No     Comment: former   • Drug use: No     Comment: former, opiates           Objective   Physical Exam   Constitutional: He appears well-developed and well-nourished.   HENT:   Head: Normocephalic.   Eyes: EOM are normal. Pupils are equal,  round, and reactive to light.   Neck: Normal range of motion.   Cardiovascular: Normal rate.   Pulmonary/Chest: Effort normal.   Abdominal: Soft.   Musculoskeletal: Normal range of motion.   Neurological: He is alert.   Skin: Skin is warm.   Nursing note and vitals reviewed.      Procedures           ED Course                  MDM      Final diagnoses:   Type 1 diabetes mellitus without complication (CMS/[facility])            Elliot Hurst MD  02/09/19 4131

## 2019-06-11 ENCOUNTER — OFFICE VISIT (OUTPATIENT)
Dept: INTERNAL MEDICINE | Facility: CLINIC | Age: 29
End: 2019-06-11

## 2019-06-11 VITALS
DIASTOLIC BLOOD PRESSURE: 84 MMHG | TEMPERATURE: 97.1 F | HEART RATE: 115 BPM | SYSTOLIC BLOOD PRESSURE: 122 MMHG | BODY MASS INDEX: 17.04 KG/M2 | OXYGEN SATURATION: 98 % | HEIGHT: 70 IN | WEIGHT: 119 LBS | RESPIRATION RATE: 18 BRPM

## 2019-06-11 DIAGNOSIS — H53.9 VISION DISTURBANCE: ICD-10-CM

## 2019-06-11 DIAGNOSIS — IMO0001 IDDM (INSULIN DEPENDENT DIABETES MELLITUS): ICD-10-CM

## 2019-06-11 DIAGNOSIS — F41.9 ANXIETY: ICD-10-CM

## 2019-06-11 DIAGNOSIS — Z74.1 REQUIRES DAILY ASSISTANCE FOR ACTIVITIES OF DAILY LIVING (ADL) AND COMFORT NEEDS: ICD-10-CM

## 2019-06-11 DIAGNOSIS — R13.19 NEUROGENIC DYSPHAGIA: ICD-10-CM

## 2019-06-11 DIAGNOSIS — G93.1 ANOXIC BRAIN DAMAGE (HCC): Primary | ICD-10-CM

## 2019-06-11 PROCEDURE — 99213 OFFICE O/P EST LOW 20 MIN: CPT | Performed by: FAMILY MEDICINE

## 2019-06-11 RX ORDER — INSULIN GLARGINE 100 [IU]/ML
25 INJECTION, SOLUTION SUBCUTANEOUS DAILY
Refills: 0 | COMMUNITY
Start: 2019-03-18 | End: 2019-08-07 | Stop reason: SDUPTHER

## 2019-06-11 RX ORDER — HYDROXYZINE HYDROCHLORIDE 25 MG/1
25 TABLET, FILM COATED ORAL 3 TIMES DAILY PRN
Qty: 270 TABLET | Refills: 3 | Status: SHIPPED | OUTPATIENT
Start: 2019-06-11 | End: 2019-08-07 | Stop reason: SDUPTHER

## 2019-06-14 ENCOUNTER — TELEPHONE (OUTPATIENT)
Dept: INTERNAL MEDICINE | Facility: CLINIC | Age: 29
End: 2019-06-14

## 2019-06-14 NOTE — TELEPHONE ENCOUNTER
Jose aguirre OT from [facility] would like a verbal to start OT with the patient. Please advise

## 2019-06-15 PROBLEM — F10.21 HISTORY OF ALCOHOLISM (HCC): Status: ACTIVE | Noted: 2019-06-15

## 2019-06-15 PROBLEM — K21.9 GASTRO-ESOPHAGEAL REFLUX DISEASE WITHOUT ESOPHAGITIS: Status: ACTIVE | Noted: 2019-06-15

## 2019-06-15 RX ORDER — DANTROLENE SODIUM 25 MG/1
1 CAPSULE ORAL EVERY 12 HOURS
COMMUNITY
End: 2019-08-07 | Stop reason: SDUPTHER

## 2019-06-15 RX ORDER — CITALOPRAM 20 MG/1
1 TABLET ORAL
COMMUNITY
End: 2019-08-07 | Stop reason: SDUPTHER

## 2019-06-15 RX ORDER — SYRINGE-NEEDLE,INSULIN,0.5 ML 31 GX5/16"
SYRINGE, EMPTY DISPOSABLE MISCELLANEOUS
Refills: 5 | COMMUNITY
Start: 2019-05-20 | End: 2020-02-14

## 2019-06-15 RX ORDER — FLASH GLUCOSE SENSOR
KIT MISCELLANEOUS
Refills: 5 | COMMUNITY
Start: 2019-06-04 | End: 2019-08-07 | Stop reason: SDUPTHER

## 2019-06-15 RX ORDER — CLONIDINE HYDROCHLORIDE 0.1 MG/1
1 TABLET ORAL EVERY 12 HOURS
COMMUNITY
End: 2019-08-07 | Stop reason: SDUPTHER

## 2019-06-15 RX ORDER — PROPRANOLOL HYDROCHLORIDE 20 MG/1
1 TABLET ORAL EVERY 12 HOURS
COMMUNITY
End: 2019-08-07 | Stop reason: SDUPTHER

## 2019-06-15 RX ORDER — FLASH GLUCOSE SCANNING READER
EACH MISCELLANEOUS SEE ADMIN INSTRUCTIONS
Refills: 0 | COMMUNITY
Start: 2019-06-04 | End: 2019-08-28

## 2019-06-15 RX ORDER — BACLOFEN 20 MG/1
1 TABLET ORAL EVERY 12 HOURS
COMMUNITY
End: 2019-08-07 | Stop reason: SDUPTHER

## 2019-06-15 NOTE — PROGRESS NOTES
"Subjective    Stephen Bernard Delgado is a 28 y.o. male here for:    Chief Complaint   Patient presents with   • Follow-up     Est care/pt also needs referral home health for neurologist.       History of Present Illness     Moved back to this area, was back around [facility] for a period to get help from family. Patient has improved since the last time I saw him, is able to walk with some difficulty, speaking but hard to understand. Needs to get established with providers now that he's back here. Brother is here for assistance and explains all the needed services and referrals.     The following portions of the patient's history were reviewed and updated as appropriate: allergies, current medications, past family history, past medical history, past social history, past surgical history and problem list.    Health Maintenance   Topic Date Due   • URINE MICROALBUMIN  1990   • PNEUMOCOCCAL VACCINE (19-64 MEDIUM RISK) (1 of 1 - PPSV23) 08/21/2009   • TDAP/TD VACCINES (1 - Tdap) 08/21/2009   • MEDICARE ANNUAL WELLNESS  06/19/2017   • DIABETIC FOOT EXAM  06/19/2017   • DIABETIC EYE EXAM  06/19/2017   • HEMOGLOBIN A1C  01/20/2018   • INFLUENZA VACCINE  08/01/2019       Review of Systems    Vitals:    06/11/19 1338   BP: 122/84   Pulse: 115   Resp: 18   Temp: 97.1 °F (36.2 °C)   SpO2: 98%   Weight: 54 kg (119 lb)   Height: 177.8 cm (70\")         Objective   Physical Exam   Constitutional: He is oriented to person, place, and time. Vital signs are normal. He appears well-developed and well-nourished. He is active.  Non-toxic appearance. He does not have a sickly appearance. He does not appear ill. No distress.   HENT:   Head: Normocephalic and atraumatic.   Right Ear: Hearing normal.   Left Ear: Hearing normal.   Nose: Nose normal.   Mouth/Throat: Mucous membranes are not dry.   Eyes: EOM are normal. No scleral icterus.   Neck: Neck supple.   Speech is hard to understand   Pulmonary/Chest: Effort normal.   Musculoskeletal: "   Spastic upper extremities   Neurological: He is alert and oriented to person, place, and time. He exhibits abnormal muscle tone. Gait abnormal.   Skin: Skin is warm. He is not diaphoretic. No cyanosis. No pallor.   Glucose monitoring disc on back of left upper arm.    Psychiatric: He has a normal mood and affect. His speech is normal and behavior is normal. Judgment and thought content normal. Cognition and memory are normal.   Nursing note and vitals reviewed.        Assessment/Plan     Problem List Items Addressed This Visit        Digestive    Neurogenic dysphagia    Relevant Orders    Ambulatory Referral to Neurology    Ambulatory Referral to Home Health       Endocrine    IDDM (insulin dependent diabetes mellitus) (CMS/[facility])    Relevant Medications    Insulin Glargine (BASAGLAR KWIKPEN) 100 UNIT/ML injection pen    insulin lispro (humaLOG) 100 UNIT/ML injection    insulin aspart (NOVOLOG FLEXPEN) 100 UNIT/ML solution pen-injector sc pen    Insulin Glargine (LANTUS SOLOSTAR) 100 UNIT/ML injection pen    Other Relevant Orders    Ambulatory Referral to Endocrinology    Ambulatory Referral to Home Health       Nervous and Auditory    Anoxic brain damage (CMS/[facility]) - Primary    Relevant Orders    Ambulatory Referral to Neurology    Ambulatory Referral to Home Health       Other    Requires daily assistance for activities of daily living (ADL) and comfort needs    Relevant Orders    Ambulatory Referral to Home Health      Other Visit Diagnoses     Vision disturbance        Relevant Orders    Ambulatory Referral to Optometry    Anxiety        Relevant Medications    hydrOXYzine (ATARAX) 25 MG tablet          ·     Return for As Needed. or sooner if needed.    Krysta Ordonez MD

## 2019-06-21 ENCOUNTER — OUTSIDE FACILITY SERVICE (OUTPATIENT)
Dept: INTERNAL MEDICINE | Facility: CLINIC | Age: 29
End: 2019-06-21

## 2019-06-21 PROCEDURE — G0180 MD CERTIFICATION HHA PATIENT: HCPCS | Performed by: FAMILY MEDICINE

## 2019-06-24 ENCOUNTER — TELEPHONE (OUTPATIENT)
Dept: INTERNAL MEDICINE | Facility: CLINIC | Age: 29
End: 2019-06-24

## 2019-06-24 NOTE — TELEPHONE ENCOUNTER
Milana the  from [facility] called needing a verbal in order to see patient. Please advise

## 2019-07-09 ENCOUNTER — TELEPHONE (OUTPATIENT)
Dept: INTERNAL MEDICINE | Facility: CLINIC | Age: 29
End: 2019-07-09

## 2019-07-09 NOTE — TELEPHONE ENCOUNTER
Edis from [facility] called and stated that the patient is having a lot of sinus pressure with green/yellowish mucus and was wondering if an antibx could be sent in for patient. Please advise

## 2019-07-10 NOTE — TELEPHONE ENCOUNTER
I need more info, or he needs to be seen. How many days? Typically we don't tx with antibiotic until 10 days.

## 2019-07-10 NOTE — TELEPHONE ENCOUNTER
Left message for steve advising for patient to try OTC treatment and if sx last longer than 10 days to call us back or patient needs to be seen with a provider

## 2019-07-12 ENCOUNTER — TELEPHONE (OUTPATIENT)
Dept: INTERNAL MEDICINE | Facility: CLINIC | Age: 29
End: 2019-07-12

## 2019-07-12 NOTE — TELEPHONE ENCOUNTER
Jose from [facility] called stating that he has been seeing the patient for the last 4 weeks and the patient wanted to start to work on feeding tasks and jose would like a verbal to start this with the patient. Please advise

## 2019-08-04 ENCOUNTER — HOSPITAL ENCOUNTER (EMERGENCY)
Facility: HOSPITAL | Age: 29
Discharge: HOME | End: 2019-08-04
Attending: EMERGENCY MEDICINE | Admitting: EMERGENCY MEDICINE

## 2019-08-04 ENCOUNTER — APPOINTMENT (OUTPATIENT)
Dept: GENERAL RADIOLOGY | Facility: HOSPITAL | Age: 29
End: 2019-08-04

## 2019-08-04 VITALS
BODY MASS INDEX: 16.66 KG/M2 | RESPIRATION RATE: 17 BRPM | HEART RATE: 74 BPM | TEMPERATURE: 98.6 F | OXYGEN SATURATION: 96 % | DIASTOLIC BLOOD PRESSURE: 68 MMHG | WEIGHT: 119 LBS | HEIGHT: 71 IN | SYSTOLIC BLOOD PRESSURE: 112 MMHG

## 2019-08-04 DIAGNOSIS — R56.9 SEIZURE (HCC): Primary | ICD-10-CM

## 2019-08-04 LAB
ALBUMIN SERPL-MCNC: 4.4 G/DL (ref 3.5–5)
ALBUMIN/GLOB SERPL: 1.6 G/DL (ref 1–2)
ALP SERPL-CCNC: 92 U/L (ref 38–126)
ALT SERPL W P-5'-P-CCNC: 31 U/L (ref 13–69)
ANION GAP SERPL CALCULATED.3IONS-SCNC: 11.6 MMOL/L (ref 10–20)
AST SERPL-CCNC: 21 U/L (ref 15–46)
BASOPHILS # BLD AUTO: 0.05 10*3/MM3 (ref 0–0.2)
BASOPHILS NFR BLD AUTO: 0.8 % (ref 0–1.5)
BILIRUB SERPL-MCNC: 1.7 MG/DL (ref 0.2–1.3)
BILIRUB UR QL STRIP: NEGATIVE
BUN BLD-MCNC: 15 MG/DL (ref 7–20)
BUN/CREAT SERPL: 18.8 (ref 6.3–21.9)
CALCIUM SPEC-SCNC: 9.3 MG/DL (ref 8.4–10.2)
CHLORIDE SERPL-SCNC: 99 MMOL/L (ref 98–107)
CLARITY UR: CLEAR
CO2 SERPL-SCNC: 31 MMOL/L (ref 26–30)
COLOR UR: YELLOW
CREAT BLD-MCNC: 0.8 MG/DL (ref 0.6–1.3)
DEPRECATED RDW RBC AUTO: 41.8 FL (ref 37–54)
EOSINOPHIL # BLD AUTO: 0.18 10*3/MM3 (ref 0–0.4)
EOSINOPHIL NFR BLD AUTO: 2.8 % (ref 0.3–6.2)
ERYTHROCYTE [DISTWIDTH] IN BLOOD BY AUTOMATED COUNT: 13 % (ref 12.3–15.4)
GFR SERPL CREATININE-BSD FRML MDRD: 115 ML/MIN/1.73
GLOBULIN UR ELPH-MCNC: 2.7 GM/DL
GLUCOSE BLD-MCNC: 271 MG/DL (ref 74–98)
GLUCOSE BLDC GLUCOMTR-MCNC: 266 MG/DL (ref 70–130)
GLUCOSE UR STRIP-MCNC: ABNORMAL MG/DL
HCT VFR BLD AUTO: 46 % (ref 37.5–51)
HGB BLD-MCNC: 15.7 G/DL (ref 13–17.7)
HGB UR QL STRIP.AUTO: NEGATIVE
HOLD SPECIMEN: NORMAL
IMM GRANULOCYTES # BLD AUTO: 0.02 10*3/MM3 (ref 0–0.05)
IMM GRANULOCYTES NFR BLD AUTO: 0.3 % (ref 0–0.5)
KETONES UR QL STRIP: NEGATIVE
LEUKOCYTE ESTERASE UR QL STRIP.AUTO: NEGATIVE
LYMPHOCYTES # BLD AUTO: 1.71 10*3/MM3 (ref 0.7–3.1)
LYMPHOCYTES NFR BLD AUTO: 26.2 % (ref 19.6–45.3)
MAGNESIUM SERPL-MCNC: 1.8 MG/DL (ref 1.6–2.3)
MCH RBC QN AUTO: 30 PG (ref 26.6–33)
MCHC RBC AUTO-ENTMCNC: 34.1 G/DL (ref 31.5–35.7)
MCV RBC AUTO: 88 FL (ref 79–97)
MONOCYTES # BLD AUTO: 0.49 10*3/MM3 (ref 0.1–0.9)
MONOCYTES NFR BLD AUTO: 7.5 % (ref 5–12)
NEUTROPHILS # BLD AUTO: 4.07 10*3/MM3 (ref 1.7–7)
NEUTROPHILS NFR BLD AUTO: 62.4 % (ref 42.7–76)
NITRITE UR QL STRIP: NEGATIVE
NRBC BLD AUTO-RTO: 0 /100 WBC (ref 0–0.2)
PH UR STRIP.AUTO: 7 [PH] (ref 5–8)
PLATELET # BLD AUTO: 263 10*3/MM3 (ref 140–450)
PMV BLD AUTO: 9 FL (ref 6–12)
POTASSIUM BLD-SCNC: 4.6 MMOL/L (ref 3.5–5.1)
PROT SERPL-MCNC: 7.1 G/DL (ref 6.3–8.2)
PROT UR QL STRIP: NEGATIVE
RBC # BLD AUTO: 5.23 10*6/MM3 (ref 4.14–5.8)
SODIUM BLD-SCNC: 137 MMOL/L (ref 137–145)
SP GR UR STRIP: 1.01 (ref 1–1.03)
TROPONIN I SERPL-MCNC: <0.012 NG/ML (ref 0–0.03)
UROBILINOGEN UR QL STRIP: ABNORMAL
WBC NRBC COR # BLD: 6.52 10*3/MM3 (ref 3.4–10.8)
WHOLE BLOOD HOLD SPECIMEN: NORMAL
WHOLE BLOOD HOLD SPECIMEN: NORMAL

## 2019-08-04 PROCEDURE — 71045 X-RAY EXAM CHEST 1 VIEW: CPT

## 2019-08-04 PROCEDURE — 80053 COMPREHEN METABOLIC PANEL: CPT | Performed by: EMERGENCY MEDICINE

## 2019-08-04 PROCEDURE — 93005 ELECTROCARDIOGRAM TRACING: CPT

## 2019-08-04 PROCEDURE — 84484 ASSAY OF TROPONIN QUANT: CPT | Performed by: EMERGENCY MEDICINE

## 2019-08-04 PROCEDURE — 85025 COMPLETE CBC W/AUTO DIFF WBC: CPT | Performed by: EMERGENCY MEDICINE

## 2019-08-04 PROCEDURE — 99284 EMERGENCY DEPT VISIT MOD MDM: CPT

## 2019-08-04 PROCEDURE — 83735 ASSAY OF MAGNESIUM: CPT | Performed by: EMERGENCY MEDICINE

## 2019-08-04 PROCEDURE — 81003 URINALYSIS AUTO W/O SCOPE: CPT | Performed by: EMERGENCY MEDICINE

## 2019-08-04 PROCEDURE — 82962 GLUCOSE BLOOD TEST: CPT

## 2019-08-04 RX ORDER — SODIUM CHLORIDE 0.9 % (FLUSH) 0.9 %
10 SYRINGE (ML) INJECTION AS NEEDED
Status: DISCONTINUED | OUTPATIENT
Start: 2019-08-04 | End: 2019-08-04 | Stop reason: HOSPADM

## 2019-08-04 RX ADMIN — SODIUM CHLORIDE 1000 ML: 9 INJECTION, SOLUTION INTRAVENOUS at 17:16

## 2019-08-04 NOTE — ED PROVIDER NOTES
TRIAGE CHIEF COMPLAINT:     Nursing and triage notes reviewed    Chief Complaint   Patient presents with   • Seizures      HPI: Stephen Delgado is a 28 y.o. male who presents to the emergency department complaining of a seizure.  Patient does have a history of similar episodes.  Witnessed describes an episode where patient stared off into space and was completely nonresponsive.  He states that episodes are typically similar but patient will then break out into a tonic-clonic seizure.  Patient did not have shaking in this episode.  Patient did have a postictal state and was very confused immediately afterwards but has since returned to his baseline.  The patient is now had 4 seizures within the past 2-1/2 years.  3 in the past 3 months.  Patient does not currently take any seizure medications but has been told he might have to take some if he continues to have episodes.  Patient has not been sick recently aside from some nasal drainage.  Denies having fevers or chills.  No vomiting or diarrhea.  No abdominal pain, no chest pain.    REVIEW OF SYSTEMS: All other systems reviewed and are negative     PAST MEDICAL HISTORY:   Past Medical History:   Diagnosis Date   • Alcohol dependence in remission (CMS/[facility])    • Anoxic brain damage (CMS/[facility])    • Anoxic brain injury (CMS/[facility])    • Cardiac arrest due to other underlying condition (CMS/[facility]) 03/15/2017   • DKA (diabetic ketoacidosis) (CMS/[facility])    • G tube feedings (CMS/[facility])    • Hirschsprung's disease    • Opioid abuse (CMS/[facility])    • Shock liver 03/15/2017   • Type 1 diabetes mellitus on insulin therapy (CMS/[facility])         FAMILY HISTORY:   Family History   Problem Relation Age of Onset   • Arthritis Maternal Aunt    • Diabetes Maternal Aunt    • Heart attack Maternal Grandfather    • Diabetes Cousin         SOCIAL HISTORY:   Social History     Socioeconomic History   • Marital status: Single     Spouse name: Not on file   • Number of children: Not on file   • Years of  "education: Not on file   • Highest education level: Not on file   Tobacco Use   • Smoking status: Never Smoker   Substance and Sexual Activity   • Alcohol use: No     Comment: former   • Drug use: No     Comment: former, opiates        SURGICAL HISTORY:   Past Surgical History:   Procedure Laterality Date   • COLOSTOMY     • KNEE ACL RECONSTRUCTION Right    • TRACHEOSTOMY  03/2017        CURRENT MEDICATIONS:      Medication List      CONTINUE taking these medications    FREESTYLE LOUISE 14 DAY READER device     FREESTYLE LOUISE 14 DAY SENSOR misc     RELION INSULIN SYR 0.5ML/31G 31G X 5/16\" 0.5 ML misc  Generic drug:  Insulin Syringe-Needle U-100        ASK your doctor about these medications    * amantadine 50 MG/5ML solution  Commonly known as:  SYMMETREL     * amantadine 50 MG/5ML solution  Commonly known as:  SYMMETREL     * baclofen 20 MG tablet  Commonly known as:  LIORESAL     * baclofen 10 MG tablet  Commonly known as:  LIORESAL     * citalopram 20 MG tablet  Commonly known as:  CeleXA     * citalopram 20 MG tablet  Commonly known as:  CeleXA     * CloNIDine 0.1 MG tablet  Commonly known as:  CATAPRES     * CloNIDine 0.1 MG tablet  Commonly known as:  CATAPRES     * dantrolene 100 MG capsule  Commonly known as:  DANTRIUM     * dantrolene 25 MG capsule  Commonly known as:  DANTRIUM     FREESTYLE LITE test strip  Generic drug:  glucose blood     hydrOXYzine 25 MG tablet  Commonly known as:  ATARAX  Take 1 tablet by mouth 3 (Three) Times a Day As Needed for Anxiety.     * Insulin Glargine 100 UNIT/ML injection pen  Commonly known as:  BASAGLAR KWIKPEN     * Insulin Glargine 100 UNIT/ML injection pen  Commonly known as:  LANTUS SOLOSTAR     * insulin lispro 100 UNIT/ML injection  Commonly known as:  humaLOG     * ADMELOG SC     NOVOLOG FLEXPEN 100 UNIT/ML solution pen-injector sc pen  Generic drug:  insulin aspart     * propranolol 20 MG tablet  Commonly known as:  INDERAL     * propranolol 20 MG tablet  Commonly " known as:  INDERAL         * This list has 16 medication(s) that are the same as other medications   prescribed for you. Read the directions carefully, and ask your doctor or   other care provider to review them with you.               ALLERGIES: Penicillins     PHYSICAL EXAM:   VITAL SIGNS:   Vitals:    08/04/19 1628   BP: 120/83   Pulse: 96   Resp:    Temp:    SpO2: 96%      CONSTITUTIONAL: Awake, oriented, appears non-toxic, resting comfortably in bed  HENT: Atraumatic, normocephalic, oral mucosa pink and moist, airway patent.  EYES: Conjunctiva clear   NECK: Trachea midline, non-tender, supple   CARDIOVASCULAR: Normal heart rate, Normal rhythm, No murmurs, rubs, gallops   PULMONARY/CHEST: Clear to auscultation, no rhonchi, wheezes, or rales. Symmetrical breath sounds.  ABDOMINAL: Non-distended, soft, non-tender - no rebound or guarding.  NEUROLOGIC: Non-focal, moving all four extremities, no gross sensory or motor deficits.   EXTREMITIES: No clubbing, cyanosis, or edema   SKIN: Warm, Dry, No erythema, No rash     ED COURSE / MEDICAL DECISION MAKING:   Stephen Delgado is a 28 y.o. male who presents to the emergency department for evaluation of a seizure.  Patient with a history of the same.  Patient is alert and oriented on arrival and at his baseline.  Vital signs are stable.  Physical examination is largely unremarkable.  Will obtain laboratory tests and a chest x-ray for further evaluation of patient's symptoms.    An EKG was obtained on arrival which I interpreted and reveals sinus rhythm with a rate of 91 bpm.  There are some nonspecific findings but no obvious acute signs of arrhythmia or ischemia.  This is a normal-appearing EKG.    Laboratory testing was largely unremarkable.  Urinalysis had glucose but otherwise showed no sign of infection.  Chest x-ray per my interpretation revealed no acute cardiopulmonary process.  Patient's vital signs remained stable in the emergency department, as did the patient.   No further seizure-like activity.  I did discuss potential antiepileptic medication.  Patient and brother stated they would rather hold off on this until they spoke with the patient's primary care physician.  As patient has only intermittently had some seizures I felt comfortable with this plan but advised him to return if he had any further seizure-like activity or other new symptoms.    DECISION TO DISCHARGE/ADMIT: see ED care timeline     FINAL IMPRESSION:   1 --seizure  2 --   3 --     Electronically signed by: Mary Dietrich MD, 8/4/2019 4:53 PM       Mary Dietrich MD  08/04/19 1822

## 2019-08-07 ENCOUNTER — OFFICE VISIT (OUTPATIENT)
Dept: INTERNAL MEDICINE | Facility: CLINIC | Age: 29
End: 2019-08-07

## 2019-08-07 VITALS
SYSTOLIC BLOOD PRESSURE: 104 MMHG | HEIGHT: 71 IN | OXYGEN SATURATION: 95 % | BODY MASS INDEX: 16.4 KG/M2 | DIASTOLIC BLOOD PRESSURE: 66 MMHG | RESPIRATION RATE: 16 BRPM | HEART RATE: 118 BPM | TEMPERATURE: 98.1 F | WEIGHT: 117.13 LBS

## 2019-08-07 DIAGNOSIS — M24.532 CONTRACTURE OF LEFT WRIST: ICD-10-CM

## 2019-08-07 DIAGNOSIS — M62.838 MUSCLE SPASTICITY: ICD-10-CM

## 2019-08-07 DIAGNOSIS — I10 NEUROGENIC HYPERTENSION: ICD-10-CM

## 2019-08-07 DIAGNOSIS — IMO0001 IDDM (INSULIN DEPENDENT DIABETES MELLITUS): Primary | ICD-10-CM

## 2019-08-07 DIAGNOSIS — R56.9 SEIZURES (HCC): ICD-10-CM

## 2019-08-07 DIAGNOSIS — F41.9 ANXIETY: ICD-10-CM

## 2019-08-07 DIAGNOSIS — G93.1 ANOXIC BRAIN DAMAGE (HCC): ICD-10-CM

## 2019-08-07 LAB — HBA1C MFR BLD: 8.4 %

## 2019-08-07 PROCEDURE — 83036 HEMOGLOBIN GLYCOSYLATED A1C: CPT | Performed by: FAMILY MEDICINE

## 2019-08-07 PROCEDURE — 99214 OFFICE O/P EST MOD 30 MIN: CPT | Performed by: FAMILY MEDICINE

## 2019-08-07 RX ORDER — INSULIN GLARGINE 100 [IU]/ML
25 INJECTION, SOLUTION SUBCUTANEOUS DAILY
Qty: 8 PEN | Refills: 3 | Status: SHIPPED | OUTPATIENT
Start: 2019-08-07 | End: 2020-02-14 | Stop reason: SDUPTHER

## 2019-08-07 RX ORDER — CITALOPRAM 20 MG/1
20 TABLET ORAL 2 TIMES DAILY
Qty: 180 TABLET | Refills: 3 | Status: SHIPPED | OUTPATIENT
Start: 2019-08-07 | End: 2020-02-14 | Stop reason: SDUPTHER

## 2019-08-07 RX ORDER — LEVETIRACETAM 500 MG/1
500 TABLET ORAL 2 TIMES DAILY
Qty: 180 TABLET | Refills: 3 | Status: SHIPPED | OUTPATIENT
Start: 2019-08-07 | End: 2020-07-22

## 2019-08-07 RX ORDER — BACLOFEN 10 MG/1
10 TABLET ORAL 2 TIMES DAILY
Qty: 180 TABLET | Refills: 3 | Status: SHIPPED | OUTPATIENT
Start: 2019-08-07 | End: 2020-04-02 | Stop reason: SDUPTHER

## 2019-08-07 RX ORDER — HYDROXYZINE HYDROCHLORIDE 25 MG/1
25 TABLET, FILM COATED ORAL 3 TIMES DAILY PRN
Qty: 270 TABLET | Refills: 3 | Status: SHIPPED | OUTPATIENT
Start: 2019-08-07 | End: 2021-01-12

## 2019-08-07 RX ORDER — DANTROLENE SODIUM 25 MG/1
25 CAPSULE ORAL 4 TIMES DAILY
Qty: 360 CAPSULE | Refills: 3 | Status: SHIPPED | OUTPATIENT
Start: 2019-08-07 | End: 2020-02-14 | Stop reason: SDUPTHER

## 2019-08-07 RX ORDER — PROPRANOLOL HYDROCHLORIDE 20 MG/1
20 TABLET ORAL 2 TIMES DAILY
Qty: 180 TABLET | Refills: 3 | Status: SHIPPED | OUTPATIENT
Start: 2019-08-07 | End: 2020-11-02

## 2019-08-07 NOTE — PROGRESS NOTES
"Subjective    Stephen Bernard Delgado is a 28 y.o. male here for:  Chief Complaint   Patient presents with   • Wrist Injury     would like to discuss surgery for left wrist    • Seizures     would also like to discuss medications for seizures        S/p anoxic brain injury. Speech has improved somewhat over time. Brother still helping with care. Struggling with contraction of the left wrist, fixed in flexed state. Interested in tendon release more than Botox injection. Has had PT.     Seizure disorder: does not have a neurologist, brother states he was not aware of recent appointment that was set up. Has had three seizures at least, they seem to be occurring more frequently. Not on medicine for seizures.     Anxiety: recurrent, stable on citalopram with vistaril prn.      Diabetes   He presents for his follow-up diabetic visit. He has type 1 diabetes mellitus. Hypoglycemia symptoms include seizures. Risk factors for coronary artery disease include diabetes mellitus. Current diabetic treatment includes insulin injections. He is following a generally healthy diet. Meal planning includes avoidance of concentrated sweets. An ACE inhibitor/angiotensin II receptor blocker is not being taken.          The following portions of the patient's history were reviewed and updated as appropriate: allergies, current medications, past family history, past medical history, past social history, past surgical history and problem list.    Review of Systems   Musculoskeletal: Positive for arthralgias and myalgias.   Neurological: Positive for seizures.   Psychiatric/Behavioral: Negative for agitation and sleep disturbance.       Vitals:    08/07/19 1039   BP: 104/66   Pulse: 118   Resp: 16   Temp: 98.1 °F (36.7 °C)   TempSrc: Temporal   SpO2: 95%   Weight: 53.1 kg (117 lb 2 oz)   Height: 180.3 cm (70.98\")     Patient's Body mass index is 16.34 kg/m². BMI is below normal parameters. Recommendations include: treating the underlying disease " process.      Objective   Physical Exam   Constitutional: He is oriented to person, place, and time. Vital signs are normal. He appears well-developed and well-nourished. He is active.  Non-toxic appearance. He does not have a sickly appearance. He does not appear ill. No distress.   HENT:   Head: Normocephalic and atraumatic.   Right Ear: Hearing normal.   Left Ear: Hearing normal.   Nose: Nose normal.   Mouth/Throat: Mucous membranes are not dry.   Eyes: EOM are normal. No scleral icterus.   Neck: Phonation normal. Neck supple.   Pulmonary/Chest: Effort normal.   Musculoskeletal:        Left wrist: He exhibits deformity (contraction--flexion).   Neurological: He is alert and oriented to person, place, and time. He displays no tremor. He exhibits abnormal muscle tone. Gait abnormal.   Speech is hard to understand   Skin: Skin is warm. He is not diaphoretic. No cyanosis. No pallor.   Psychiatric: He has a normal mood and affect. His speech is normal and behavior is normal. Judgment and thought content normal. Cognition and memory are normal.   Nursing note and vitals reviewed.      Lab Results   Component Value Date    HGBA1C 8.4 08/07/2019    HGBA1C 9.0 07/20/2017    HGBA1C 8.7 (H) 01/21/2015       Assessment/Plan     Problem List Items Addressed This Visit        Endocrine    IDDM (insulin dependent diabetes mellitus) (CMS/[facility]) - Primary    Relevant Medications    insulin lispro (ADMELOG) 100 UNIT/ML injection    Insulin Glargine (BASAGLAR KWIKPEN) 100 UNIT/ML injection pen    Other Relevant Orders    POC Glycosylated Hemoglobin (Hb A1C) (Completed)       Nervous and Auditory    Anoxic brain damage (CMS/[facility])    Relevant Medications    amantadine (SYMMETREL) 50 MG/5ML solution    baclofen (LIORESAL) 10 MG tablet    dantrolene (DANTRIUM) 25 MG capsule       Musculoskeletal and Integument    Contracture of left wrist    Relevant Orders    Ambulatory Referral to Orthopedic Surgery      Other Visit Diagnoses      Seizures (CMS/H[facility])        Relevant Medications    levETIRAcetam (KEPPRA) 500 MG tablet    Other Relevant Orders    Ambulatory Referral to Neurology    Anxiety        Relevant Medications    citalopram (CeleXA) 20 MG tablet    hydrOXYzine (ATARAX) 25 MG tablet    propranolol (INDERAL) 20 MG tablet    Muscle spasticity        Relevant Medications    baclofen (LIORESAL) 10 MG tablet    dantrolene (DANTRIUM) 25 MG capsule    Neurogenic hypertension        Relevant Medications    propranolol (INDERAL) 20 MG tablet          Diabetes mellitus insulin dependent and uncontrolled. Patient must be testing 4 times a day and injecting 3 times a day at minimal.    Return for As Needed.    Krysta Ordonez MD

## 2019-08-27 ENCOUNTER — TELEPHONE (OUTPATIENT)
Dept: INTERNAL MEDICINE | Facility: CLINIC | Age: 29
End: 2019-08-27

## 2019-08-27 DIAGNOSIS — E10.8 TYPE 1 DIABETES MELLITUS WITH UNSPECIFIED COMPLICATIONS (HCC): Primary | ICD-10-CM

## 2019-08-27 NOTE — TELEPHONE ENCOUNTER
Patients brother called concerning his Freestyle device.  The new insurance no longer covers his strips for the device.  He did find out that it does cover the DEXCOM (?) system and was wondering if he could get a prescription for that.  Right now he is out of strips.  Pharmacy and phone number verified.

## 2019-08-28 RX ORDER — PROCHLORPERAZINE 25 MG/1
1 SUPPOSITORY RECTAL ONCE
Qty: 1 DEVICE | Refills: 0 | Status: SHIPPED | OUTPATIENT
Start: 2019-08-28 | End: 2019-08-28

## 2019-08-28 RX ORDER — PROCHLORPERAZINE 25 MG/1
SUPPOSITORY RECTAL
Qty: 9 EACH | Refills: 4 | Status: SHIPPED | OUTPATIENT
Start: 2019-08-28 | End: 2019-09-17 | Stop reason: SDUPTHER

## 2019-08-28 RX ORDER — PROCHLORPERAZINE 25 MG/1
1 SUPPOSITORY RECTAL ONCE
Qty: 1 EACH | Refills: 0 | Status: SHIPPED | OUTPATIENT
Start: 2019-08-28 | End: 2019-08-28

## 2019-09-17 ENCOUNTER — TELEPHONE (OUTPATIENT)
Dept: INTERNAL MEDICINE | Facility: CLINIC | Age: 29
End: 2019-09-17

## 2019-09-17 DIAGNOSIS — E10.8 TYPE 1 DIABETES MELLITUS WITH UNSPECIFIED COMPLICATIONS (HCC): ICD-10-CM

## 2019-09-17 RX ORDER — PROCHLORPERAZINE 25 MG/1
SUPPOSITORY RECTAL
Qty: 9 EACH | Refills: 4 | Status: SHIPPED | OUTPATIENT
Start: 2019-09-17 | End: 2019-11-21

## 2019-09-17 NOTE — TELEPHONE ENCOUNTER
Patients caregiver called regarding the DEXCOM glucose system.  The pharmacy has the order for the sensor but is still needing an order for the scanner/reader, and the  part of the system.  Pharmacy verified. Phone number verified.  Thank you.

## 2019-09-17 NOTE — TELEPHONE ENCOUNTER
Spoke with pharmacy and they stated they had all the orders that they needed but it was not covered through insurance so I called the patients caregiver and he stated he would call the insurance company and get it straightened out.

## 2019-09-18 DIAGNOSIS — E10.8 TYPE 1 DIABETES MELLITUS WITH UNSPECIFIED COMPLICATIONS (HCC): Primary | ICD-10-CM

## 2019-09-18 RX ORDER — PROCHLORPERAZINE 25 MG/1
SUPPOSITORY RECTAL
Qty: 9 EACH | Refills: 3 | Status: SHIPPED | OUTPATIENT
Start: 2019-09-18 | End: 2019-11-21

## 2019-09-18 RX ORDER — PROCHLORPERAZINE 25 MG/1
1 SUPPOSITORY RECTAL ONCE
Qty: 1 EACH | Refills: 0 | Status: SHIPPED | OUTPATIENT
Start: 2019-09-18 | End: 2019-09-18

## 2019-09-18 RX ORDER — PROCHLORPERAZINE 25 MG/1
1 SUPPOSITORY RECTAL ONCE
Qty: 1 DEVICE | Refills: 0 | Status: SHIPPED | OUTPATIENT
Start: 2019-09-18 | End: 2019-09-18

## 2019-09-25 ENCOUNTER — TELEPHONE (OUTPATIENT)
Dept: INTERNAL MEDICINE | Facility: CLINIC | Age: 29
End: 2019-09-25

## 2019-09-25 NOTE — TELEPHONE ENCOUNTER
PATIENTS FATHER CALLED WANTING TO KNOW IF DR. 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 PLEASE ADVISE AND CALL AND LET HIM HIM KNOW WHEN TO  WAS SUPPOSED TO HAD YESTERDAY. THANKS

## 2019-09-25 NOTE — TELEPHONE ENCOUNTER
Letter done. I was just told about the letter this morning so the quick turn around when I'm busy in clinic is difficult. In the future if possible I need them to give me at least 24 to preferably 48 hrs notice.

## 2019-09-25 NOTE — TELEPHONE ENCOUNTER
Called daniel and informed him the letter was written and told him to next time to please give 24-48 hr notice and he said he would try apparently they asked him the same day for it.     Thank you

## 2019-10-15 ENCOUNTER — TELEPHONE (OUTPATIENT)
Dept: INTERNAL MEDICINE | Facility: CLINIC | Age: 29
End: 2019-10-15

## 2019-10-15 RX ORDER — FLASH GLUCOSE SENSOR
1 KIT MISCELLANEOUS
Qty: 6 EACH | Refills: 3 | Status: SHIPPED | OUTPATIENT
Start: 2019-10-15 | End: 2020-11-20 | Stop reason: SDUPTHER

## 2019-10-15 NOTE — TELEPHONE ENCOUNTER
Patients brother called wanting to see if we are able to refill his brothers LOUISE medication. He stated this medication has not been prescribed by our office before, but said his brother will be completely out after today. I informed patients brother that Dr. Ordonez is out of the office until Thursday, but he is still requesting a refill on this medication. If we are able to fulfil this request, please [facility] here in [place], but if we not able to refill this medication, please contact patients brother.

## 2019-10-30 NOTE — TELEPHONE ENCOUNTER
Patients caregiver is needing a refill of his test strips for the Freestyle Lite.  [facility] pharmacy verified.  Phone number verified.

## 2019-11-05 ENCOUNTER — TELEPHONE (OUTPATIENT)
Dept: INTERNAL MEDICINE | Facility: CLINIC | Age: 29
End: 2019-11-05

## 2019-11-07 ENCOUNTER — TELEPHONE (OUTPATIENT)
Dept: INTERNAL MEDICINE | Facility: CLINIC | Age: 29
End: 2019-11-07

## 2019-11-07 NOTE — TELEPHONE ENCOUNTER
JOSE FROM [employer] [facility] CALLED TO CONFIRM IF A FAX THAT WAS SENT ON 11/04/2019 WAS RECEIVED.   IT WAS A CERTIFICATE OF MEDICAL NECESSITY AND A REQUEST FOR RECORDS.     PLEASE CALL FOR ANY QUESTIONS OR CONCERNS.   [phone]

## 2019-11-12 ENCOUNTER — TELEPHONE (OUTPATIENT)
Dept: INTERNAL MEDICINE | Facility: CLINIC | Age: 29
End: 2019-11-12

## 2019-11-12 NOTE — TELEPHONE ENCOUNTER
[facility] called because they need an addendum made to the Office notes for 8/7/19 visit. They need the notes to say that patient needs to check a minium of 4 times a day and inject 3 times a day. They need this faxed over by the 15th of November.    Fax number is [phone]

## 2019-11-13 ENCOUNTER — TELEPHONE (OUTPATIENT)
Dept: INTERNAL MEDICINE | Facility: CLINIC | Age: 29
End: 2019-11-13

## 2019-11-13 NOTE — TELEPHONE ENCOUNTER
Keily with [facility] says the note from 08/07/2019 needs to show the pt is testing 4 times a day and injecting 3 times a day for them to give the diabetic supplies.  She is requesting to have the note faxed to [phone].

## 2019-11-19 ENCOUNTER — TELEPHONE (OUTPATIENT)
Dept: INTERNAL MEDICINE | Facility: CLINIC | Age: 29
End: 2019-11-19

## 2019-11-19 NOTE — TELEPHONE ENCOUNTER
"This patient is coming in on Thursday afternoon. He is trying to get diabetic testing supplies and the company requires strict documentation that the patient is \"currently testing x times per day, and is injecting x times per day\". The note must be signed and dated by Brielle and will need to be sent to the company the same day (hopefully). The company plans to reach back out to me on Thursday regarding the matter. Please let me know if you have any questions, I'll do my best to help.  "

## 2019-11-20 NOTE — TELEPHONE ENCOUNTER
Thank you I will try to get it completed as soon as I see the patient looks like he is scheduled for 230.  I saw Dr. Ordonez's note on him previously.

## 2019-11-21 ENCOUNTER — OFFICE VISIT (OUTPATIENT)
Dept: INTERNAL MEDICINE | Facility: CLINIC | Age: 29
End: 2019-11-21

## 2019-11-21 VITALS
TEMPERATURE: 97.9 F | RESPIRATION RATE: 16 BRPM | BODY MASS INDEX: 15.82 KG/M2 | OXYGEN SATURATION: 98 % | SYSTOLIC BLOOD PRESSURE: 104 MMHG | WEIGHT: 113 LBS | HEIGHT: 71 IN | DIASTOLIC BLOOD PRESSURE: 52 MMHG | HEART RATE: 100 BPM

## 2019-11-21 DIAGNOSIS — IMO0001 IDDM (INSULIN DEPENDENT DIABETES MELLITUS): Primary | ICD-10-CM

## 2019-11-21 DIAGNOSIS — G93.1 ANOXIC BRAIN DAMAGE (HCC): ICD-10-CM

## 2019-11-21 DIAGNOSIS — M62.838 MUSCLE SPASTICITY: ICD-10-CM

## 2019-11-21 DIAGNOSIS — R09.81 SINUS CONGESTION: ICD-10-CM

## 2019-11-21 DIAGNOSIS — M24.532 CONTRACTURE OF LEFT WRIST: ICD-10-CM

## 2019-11-21 LAB — HBA1C MFR BLD: 8.6 %

## 2019-11-21 PROCEDURE — 83036 HEMOGLOBIN GLYCOSYLATED A1C: CPT | Performed by: NURSE PRACTITIONER

## 2019-11-21 PROCEDURE — 99214 OFFICE O/P EST MOD 30 MIN: CPT | Performed by: NURSE PRACTITIONER
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
## 2019-11-27 ENCOUNTER — TELEPHONE (OUTPATIENT)
Dept: INTERNAL MEDICINE | Facility: CLINIC | Age: 29
End: 2019-11-27

## 2019-12-10 ENCOUNTER — OFFICE VISIT (OUTPATIENT)
Dept: ORTHOPEDIC SURGERY | Facility: CLINIC | Age: 29
End: 2019-12-10

## 2019-12-10 VITALS — HEART RATE: 91 BPM | HEIGHT: 71 IN | WEIGHT: 113.1 LBS | BODY MASS INDEX: 15.83 KG/M2 | OXYGEN SATURATION: 97 %

## 2019-12-10 DIAGNOSIS — M21.242 FLEXION DEFORMITY OF FINGER JOINT OF LEFT HAND: ICD-10-CM

## 2019-12-10 DIAGNOSIS — M21.232: Primary | ICD-10-CM

## 2019-12-10 DIAGNOSIS — G93.1 ANOXIC BRAIN DAMAGE (HCC): ICD-10-CM

## 2019-12-10 PROCEDURE — 99203 OFFICE O/P NEW LOW 30 MIN: CPT | Performed by: PHYSICIAN ASSISTANT
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
## 2020-02-14 ENCOUNTER — OFFICE VISIT (OUTPATIENT)
Dept: INTERNAL MEDICINE | Facility: CLINIC | Age: 30
End: 2020-02-14

## 2020-02-14 VITALS
HEIGHT: 71 IN | TEMPERATURE: 97.4 F | HEART RATE: 98 BPM | DIASTOLIC BLOOD PRESSURE: 82 MMHG | RESPIRATION RATE: 16 BRPM | SYSTOLIC BLOOD PRESSURE: 120 MMHG | WEIGHT: 114.8 LBS | BODY MASS INDEX: 16.07 KG/M2 | OXYGEN SATURATION: 98 %

## 2020-02-14 DIAGNOSIS — G93.1 ANOXIC BRAIN DAMAGE (HCC): ICD-10-CM

## 2020-02-14 DIAGNOSIS — M62.838 MUSCLE SPASTICITY: ICD-10-CM

## 2020-02-14 DIAGNOSIS — F41.9 ANXIETY: ICD-10-CM

## 2020-02-14 DIAGNOSIS — IMO0001 IDDM (INSULIN DEPENDENT DIABETES MELLITUS): ICD-10-CM

## 2020-02-14 PROCEDURE — 99214 OFFICE O/P EST MOD 30 MIN: CPT | Performed by: FAMILY MEDICINE

## 2020-02-14 RX ORDER — CLONIDINE HYDROCHLORIDE 0.1 MG/1
TABLET ORAL
Status: ON HOLD | COMMUNITY
Start: 2020-01-02 | End: 2022-04-28

## 2020-02-14 RX ORDER — SYRINGE-NEEDLE,INSULIN,0.5 ML 31 GX5/16"
SYRINGE, EMPTY DISPOSABLE MISCELLANEOUS
Qty: 200 EACH | Refills: 5 | Status: CANCELLED | OUTPATIENT
Start: 2020-02-14

## 2020-02-14 RX ORDER — CITALOPRAM 40 MG/1
40 TABLET ORAL DAILY
Qty: 90 TABLET | Refills: 3 | Status: SHIPPED | OUTPATIENT
Start: 2020-02-14 | End: 2021-01-12

## 2020-02-14 RX ORDER — DANTROLENE SODIUM 25 MG/1
25 CAPSULE ORAL 4 TIMES DAILY
Qty: 360 CAPSULE | Refills: 3 | Status: ON HOLD | OUTPATIENT
Start: 2020-02-14 | End: 2022-04-28

## 2020-02-14 RX ORDER — INSULIN GLARGINE 100 [IU]/ML
25 INJECTION, SOLUTION SUBCUTANEOUS DAILY
Qty: 8 PEN | Refills: 3 | Status: SHIPPED | OUTPATIENT
Start: 2020-02-14 | End: 2020-04-27 | Stop reason: SDUPTHER
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
## 2020-02-17 ENCOUNTER — TELEPHONE (OUTPATIENT)
Dept: INTERNAL MEDICINE | Facility: CLINIC | Age: 30
End: 2020-02-17

## 2020-02-17 NOTE — TELEPHONE ENCOUNTER
Brother of patient - Joey - guardian for patient needs a copy of note that was written last year - explaining how he cares for his brother.  Can't locate the original note - hoping that the office has copy that he can come  today.    Needs this today for DCBS by 3:30 pm!  Please call Joey @ [phone] cell to discuss

## 2020-02-18 ENCOUNTER — TELEPHONE (OUTPATIENT)
Dept: INTERNAL MEDICINE | Facility: CLINIC | Age: 30
End: 2020-02-18

## 2020-02-18 DIAGNOSIS — R73.09 LABILE BLOOD GLUCOSE: ICD-10-CM

## 2020-02-18 DIAGNOSIS — IMO0001 IDDM (INSULIN DEPENDENT DIABETES MELLITUS): Primary | ICD-10-CM

## 2020-02-18 DIAGNOSIS — IMO0001 IDDM (INSULIN DEPENDENT DIABETES MELLITUS): ICD-10-CM

## 2020-02-18 NOTE — TELEPHONE ENCOUNTER
Pharmacy called and stated that they need to know the max daily dose of Novolog for patient. Please advise?

## 2020-02-24 ENCOUNTER — TELEPHONE (OUTPATIENT)
Dept: INTERNAL MEDICINE | Facility: CLINIC | Age: 30
End: 2020-02-24

## 2020-02-24 NOTE — TELEPHONE ENCOUNTER
Patient's brother, Joey, states that he needs a copy of the letter that Dr. Ordonez wrote stating that the patient could not speak for hisself in court.  He can be reached at [phone]

## 2020-04-02 DIAGNOSIS — G93.1 ANOXIC BRAIN DAMAGE (HCC): ICD-10-CM

## 2020-04-02 DIAGNOSIS — M62.838 MUSCLE SPASTICITY: ICD-10-CM

## 2020-04-02 RX ORDER — BACLOFEN 10 MG/1
10 TABLET ORAL 2 TIMES DAILY
Qty: 180 TABLET | Refills: 3 | Status: SHIPPED | OUTPATIENT
Start: 2020-04-02 | End: 2020-04-09

## 2020-04-02 NOTE — TELEPHONE ENCOUNTER
PTS. BROTHER CALLED AND IS REQUESTING A NEW SCRIPT FOR    baclofen (LIORESAL) 10 MG tablet    PHARMACY CONFIRMED AS [address] [place] [address] [phone] [facility] [phone]      PT. CALL BACK NUMBER [phone]

## 2020-04-09 ENCOUNTER — TELEPHONE (OUTPATIENT)
Dept: INTERNAL MEDICINE | Facility: CLINIC | Age: 30
End: 2020-04-09

## 2020-04-09 DIAGNOSIS — M62.838 MUSCLE SPASTICITY: ICD-10-CM

## 2020-04-09 DIAGNOSIS — G93.1 ANOXIC BRAIN DAMAGE: ICD-10-CM

## 2020-04-09 RX ORDER — BACLOFEN 20 MG/1
20 TABLET ORAL 3 TIMES DAILY
Qty: 270 TABLET | Refills: 3 | Status: SHIPPED | OUTPATIENT
Start: 2020-04-09

## 2020-04-09 NOTE — TELEPHONE ENCOUNTER
PT BROTHER CALLED AND STATED THAT THE  baclofen (LIORESAL) 10 MG tablet, THE DOSE WAS INCORRECT. HE STATED HE PT USUALLY TAKES 2 PILLS 3 TIMES AND A DAY AND WHEN THEY WENT TO GET THE PRESCRIPTION FROM THE PHARMACY IT HAD THE WRONG DOSE. BROTHER ADVISED THEY WILL RUN OUT OD MEDICATION WITH THE AMOUNT HE HAS NOW. PLEASE ADVISE     PLEASE ADVISED DARIUS [phone]

## 2020-04-24 ENCOUNTER — TELEPHONE (OUTPATIENT)
Dept: INTERNAL MEDICINE | Facility: CLINIC | Age: 30
End: 2020-04-24

## 2020-04-24 NOTE — TELEPHONE ENCOUNTER
Advised Joey the prescription previously was for 10 mg 2 tablets TID, the prescription has changed to 20 mg 1 tablet TID. He verbalized understanding.

## 2020-04-24 NOTE — TELEPHONE ENCOUNTER
The baclofen prescription that sent in the dosage is incorrect.  It should be take 2 pills 3x day.  Pt's caregiver ask if this can be corrected and contact them when it is.

## 2020-04-27 ENCOUNTER — TELEPHONE (OUTPATIENT)
Dept: INTERNAL MEDICINE | Facility: CLINIC | Age: 30
End: 2020-04-27

## 2020-04-27 ENCOUNTER — TELEMEDICINE (OUTPATIENT)
Dept: INTERNAL MEDICINE | Facility: CLINIC | Age: 30
End: 2020-04-27

## 2020-04-27 DIAGNOSIS — G93.1 ANOXIC BRAIN DAMAGE (HCC): ICD-10-CM

## 2020-04-27 DIAGNOSIS — R53.1 WEAKNESS: ICD-10-CM

## 2020-04-27 DIAGNOSIS — IMO0001 IDDM (INSULIN DEPENDENT DIABETES MELLITUS): ICD-10-CM

## 2020-04-27 DIAGNOSIS — M24.532 CONTRACTURE OF LEFT WRIST: ICD-10-CM

## 2020-04-27 DIAGNOSIS — T79.6XXS: ICD-10-CM

## 2020-04-27 DIAGNOSIS — Z00.00 MEDICARE ANNUAL WELLNESS VISIT, SUBSEQUENT: Primary | ICD-10-CM

## 2020-04-27 DIAGNOSIS — Z74.1 REQUIRES DAILY ASSISTANCE FOR ACTIVITIES OF DAILY LIVING (ADL) AND COMFORT NEEDS: ICD-10-CM

## 2020-04-27 PROCEDURE — G0402 INITIAL PREVENTIVE EXAM: HCPCS | Performed by: FAMILY MEDICINE

## 2020-04-27 RX ORDER — INSULIN GLARGINE 100 [IU]/ML
25 INJECTION, SOLUTION SUBCUTANEOUS DAILY
Qty: 8 PEN | Refills: 3 | Status: SHIPPED | OUTPATIENT
Start: 2020-04-27 | End: 2022-05-05 | Stop reason: HOSPADM

## 2020-04-27 RX ORDER — NAPROXEN SODIUM 220 MG
1 TABLET ORAL
Qty: 300 EACH | Refills: 5 | Status: SHIPPED | OUTPATIENT
Start: 2020-04-27

## 2020-04-27 NOTE — PROGRESS NOTES
You have chosen to receive care through a telehealth visit.  Do you consent to use a video/audio connection for your medical care today? Yes    On this video visit is Zoya MARRERO CMA, Dr. Ordonez, Stephen Delgado, and the patient's mother Haley Delgado.

## 2020-04-27 NOTE — PROGRESS NOTES
"The ABCs of the Annual Wellness Visit  Subsequent Medicare Wellness Visit    Chief Complaint   Patient presents with   • Medicare Wellness-subsequent       Subjective   History of Present Illness:  Stephen Delgado is a 29 y.o. male who presents for a Subsequent Medicare Wellness Visit.    Insulin dependent diabetes, historically not well controlled. Family asists with care s/p anoxic brain injury. Sugars running around 180s he says. Was previously referred to endocrinology, will refer back. Mother requests Novolog be in vials if possible, feels they work better for Stephen than the pens, though the Basaglar seems to be doing fine as a pen.    Was doing PT for his contractures but with pandemic those have been put on hold and patient is not in as good of shape as he was previously. Continues to deal with pain of contractures. Would like to be admitted for rehab if possible to work on strength, dexterity, etc, a \"boot camp\" to get back in shape if he can.      HEALTH RISK ASSESSMENT    Recent Hospitalizations:  No hospitalization(s) within the last year.    Current Medical Providers:  Patient Care Team:  Krysta Ordonez MD as PCP - General (Family Medicine)  El Wahl MD as Consulting Physician (General Surgery)  Mariza Brink, LAZARA as Ambulatory  (Population Health)    Smoking Status:  Social History     Tobacco Use   Smoking Status Never Smoker   Smokeless Tobacco Never Used       Alcohol Consumption:  Social History     Substance and Sexual Activity   Alcohol Use No    Comment: former       Depression Screen:   PHQ-2/PHQ-9 Depression Screening 6/11/2019   Little interest or pleasure in doing things 0   Feeling down, depressed, or hopeless 0   Total Score 0       Fall Risk Screen:  STEADI Fall Risk Assessment has not been completed.    Health Habits and Functional and Cognitive Screening:  Functional & Cognitive Status 4/27/2020   Do you have difficulty preparing food and eating? Yes   Do " you have difficulty bathing yourself, getting dressed or grooming yourself? Yes   Do you have difficulty using the toilet? Yes   Do you have difficulty moving around from place to place? Yes   Do you have trouble with steps or getting out of a bed or a chair? Yes   Current Diet Well Balanced Diet   Dental Exam Not up to date   Eye Exam Up to date   Exercise (times per week) 7 times per week   Current Exercise Activities Include Walking   Do you need help using the phone?  Yes   Are you deaf or do you have serious difficulty hearing?  No   Do you need help with transportation? Yes   Do you need help shopping? Yes   Do you need help preparing meals?  Yes   Do you need help with housework?  Yes   Do you need help with laundry? Yes   Do you need help taking your medications? Yes   Do you need help managing money? Yes   Do you ever drive or ride in a car without wearing a seat belt? No   Have you felt unusual stress, anger or loneliness in the last month? No   Who do you live with? Sibling   If you need help, do you have trouble finding someone available to you? No         Does the patient have evidence of cognitive impairment? No    Asprin use counseling:Does not need ASA (and currently is not on it)    Age-appropriate Screening Schedule:  Refer to the list below for future screening recommendations based on patient's age, sex and/or medical conditions. Orders for these recommended tests are listed in the plan section. The patient has been provided with a written plan.    Health Maintenance   Topic Date Due   • URINE MICROALBUMIN  1990   • PNEUMOCOCCAL VACCINE (19-64 MEDIUM RISK) (1 of 1 - PPSV23) 08/21/2009   • DIABETIC FOOT EXAM  06/19/2017   • HEMOGLOBIN A1C  05/21/2020   • DIABETIC EYE EXAM  07/01/2020   • INFLUENZA VACCINE  08/01/2020   • TDAP/TD VACCINES (3 - Td) 02/24/2027          The following portions of the patient's history were reviewed and updated as appropriate: allergies, current medications, past  family history, past medical history, past social history, past surgical history and problem list.    Outpatient Medications Prior to Visit   Medication Sig Dispense Refill   • baclofen (LIORESAL) 20 MG tablet Take 1 tablet by mouth 3 (Three) Times a Day. 270 tablet 3   • citalopram (CeleXA) 40 MG tablet Take 1 tablet by mouth Daily. 90 tablet 3   • Continuous Blood Gluc Sensor (FREESTYLE LOUISE 14 DAY SENSOR) misc 1 each Every 14 (Fourteen) Days. 6 each 3   • dantrolene (DANTRIUM) 25 MG capsule Take 1 capsule by mouth 4 (Four) Times a Day. 360 capsule 3   • glucose blood (FREESTYLE LITE) test strip Test 20 times per day 50 each 3   • hydrOXYzine (ATARAX) 25 MG tablet Take 1 tablet by mouth 3 (Three) Times a Day As Needed for Anxiety. 270 tablet 3   • insulin aspart (NOVOLOG FLEXPEN) 100 UNIT/ML solution pen-injector sc pen Inject 5 Units under the skin into the appropriate area as directed Every 3 (Three) Hours. Sliding scale for labile glucose, max daily of 40 units. 36 mL 3   • Insulin Pen Needle (BD PEN NEEDLE AMBER U/F) 32G X 4 MM misc 1 Units Every 3 (Three) Hours As Needed (labile glucose). 300 each 4   • levETIRAcetam (KEPPRA) 500 MG tablet Take 1 tablet by mouth 2 (Two) Times a Day. 180 tablet 3   • propranolol (INDERAL) 20 MG tablet Take 1 tablet by mouth 2 (Two) Times a Day. 180 tablet 3   • Insulin Glargine (BASAGLAR KWIKPEN) 100 UNIT/ML injection pen Inject 25 Units under the skin into the appropriate area as directed Daily. 8 pen 3   • cloNIDine (CATAPRES) 0.1 MG tablet        No facility-administered medications prior to visit.        Patient Active Problem List   Diagnosis   • Anoxic brain damage (CMS/HCC)   • IDDM (insulin dependent diabetes mellitus) (CMS/HCC)   • Contracture of left wrist   • Underweight   • G tube feedings (CMS/HCC)   • Neurogenic dysphagia   • Type 1 diabetes mellitus with unspecified complications (CMS/HCC)   • Requires daily assistance for activities of daily living (ADL) and  comfort needs   • Gastro-esophageal reflux disease without esophagitis   • History of alcoholism (CMS/[facility])       Advanced Care Planning:  ACP discussion was held with the patient during this visit. Patient does not have an advance directive, declines further assistance.    Review of Systems   Constitutional: Positive for activity change.   Musculoskeletal: Positive for gait problem and myalgias.   Neurological: Positive for weakness.       Compared to one year ago, the patient feels his physical health is the same.  Compared to one year ago, the patient feels his mental health is better.    Reviewed chart for potential of high risk medication in the elderly: yes  Reviewed chart for potential of harmful drug interactions in the elderly:yes    Objective         Vitals:    04/27/20 1256   PainSc:   7       There is no height or weight on file to calculate BMI.  Discussed the patient's BMI with him. The BMI has historically been low related to DM. No recent weight measurement..    Physical Exam   Constitutional: He is oriented to person, place, and time. He is active and cooperative.  Non-toxic appearance.   Neurological: He is alert and oriented to person, place, and time.   Hard to understand speech, baseline for patient, though today I can understand some of his words.   Psychiatric: He has a normal mood and affect.   Nursing note reviewed.            Assessment/Plan   Medicare Risks and Personalized Health Plan  CMS Preventative Services Quick Reference  Advance Directive Discussion    The above risks/problems have been discussed with the patient.  Pertinent information has been shared with the patient in the After Visit Summary.  Follow up plans and orders are seen below in the Assessment/Plan Section.    Diagnoses and all orders for this visit:    1. Medicare annual wellness visit, subsequent (Primary)    2. IDDM (insulin dependent diabetes mellitus) (CMS/[facility])  -     Ambulatory Referral to Endocrinology  -      "insulin aspart (NovoLOG) 100 UNIT/ML injection; Inject 5 Units under the skin into the appropriate area as directed Every 3 (Three) Hours. Sliding scale  Dispense: 36 mL; Refill: 3  -     Insulin Glargine (BASAGLAR KWIKPEN) 100 UNIT/ML injection pen; Inject 25 Units under the skin into the appropriate area as directed Daily.  Dispense: 8 pen; Refill: 3  -     Insulin Syringe 31G X 5/16\" 0.5 ML misc; 1 Units Every 3 (Three) Hours.  Dispense: 300 each; Refill: 5    3. Anoxic brain damage (CMS/HCC)  -     Ambulatory Referral to Social Work    4. Volkmann's ischemic contracture, sequela  -     Ambulatory Referral to Social Work    5. Contracture of left wrist  -     Ambulatory Referral to Social Work    6. Requires daily assistance for activities of daily living (ADL) and comfort needs  -     Ambulatory Referral to Social Work    7. Weakness  -     Ambulatory Referral to Social Work      Follow Up:  No follow-ups on file.     An After Visit Summary and PPPS were given to the patient.             "

## 2020-04-27 NOTE — TELEPHONE ENCOUNTER
[facility] CALLED REGARDING  RX insulin aspart (NOVOLOG FLEXPEN) 100 UNIT/ML solution pen-injector sc pen WHAT IS THE DAILY MAXIMUM DOSE.    PLEASE ADVISE.  CALL BACK:[phone]

## 2020-04-28 ENCOUNTER — PATIENT OUTREACH (OUTPATIENT)
Dept: CASE MANAGEMENT | Facility: OTHER | Age: 30
End: 2020-04-28

## 2020-04-28 ENCOUNTER — EPISODE CHANGES (OUTPATIENT)
Dept: CASE MANAGEMENT | Facility: OTHER | Age: 30
End: 2020-04-28

## 2020-04-28 NOTE — TELEPHONE ENCOUNTER
Spoke with Edinson at [facility] pharmacy and advised the pt's max daily dose is 40 units.

## 2020-04-28 NOTE — OUTREACH NOTE
Care Coordination Assessment    Documented/Reviewed By:  Mariza Brink RN Date/time:  4/28/2020  5:31 PM   Assessment completed with:  family (Comment: Spoke with brother, Joey)  Enrolled in care management program:  Yes  Living arrangement:  family members (Comment: Mother and brother, Joey live with patient.)  Support system:  family  Type of residence:  private residence  Home care services:  No  Equipment used at home:  none  Communication device:  No  Bed or wheelchair confined:  No  Inadequate nutrition:  No  Medication adherence problem:  No  Experiencing side effects from current medications:  No  History of fall(s) in last 6 months:  Yes  Difficulty keeping appointments:  No

## 2020-04-28 NOTE — TELEPHONE ENCOUNTER
I sent rx yesterday for vials/bottles; mother stated they wanted those instead of pens. They do a sliding scale, max total daily would be 40 units.

## 2020-04-28 NOTE — OUTREACH NOTE
Care Plan Note      Responses   Lifestyle Goals  Routine follow-up with doctor(s), Other (See Comment) [Increase strength,   decrease Falls Risk]   Barriers  Other (See Comment) [Declined in strength and stamina]   Self Management  Medication Adherence, Other (See Comment) [Exercises guided by PT/OT]   Annual Wellness Visit:   Patient Has Completed [AWV completed 4-27-20]   Care Gaps Addressed  Flu Shot   Flu Shot Status  Refused   Does patient have depression diagnosis?  No   Ed Visits past 12 months:  1   Hospitalizations past 12 months  None   Medication Adherence  Medications understood [Medications understood by family who manage the medications and administer them to patient.]        The main concerns and/or symptoms the patient would like to address are:   Received Social Work Referral from PCP, as patient/family had Telehealth Appt with Dr. Ordonez 4-27-20, and patient/ family was requesting inpatient rehab for Therapy. - Called patient's home, spoke with his brother, Joey, whom he lives with along with the mother.  Joey and the mother are both caregivers to patient.  Joey said patient had an anoxic brain injury 3 years ago; is cognitively clear; speech is effected and difficult to understand; his body has full-body-spasticity; he can walk without assistive device, but falls frequently.  Joey said one of the family is always with him.  Stated patient can eat and drink, but does so laying down, due to swallowing issues; he can feed himself finger-foods; family feeds him anything beyond finger foods.  Joey said the family helps him with bathing, ADL's.  Joey reports Home PT ended about 5 months ago; he has been trying to maintain exercise program with patient.  He stated patient's strength and dexterity has decreased, his contracture of left wrist has worsened and become painful.     Education/instruction provided by Care Coordinator:  Explained role of Ambulatory  and contact information  given.  Inquired about patient's physical abilities and difficulties.  Inquired about patient and family's wishes for Rehab.  Joey said they would like for patient to be able to go to [facility].  Informed Joey that RN-ACM would look into what is needed, to get patient evaluated for admission to [facility], and then will call them back another day. He voiced understanding and appreciation.  Noted, AWV is up to date; Atiliot is active.        Follow Up Outreach Due:  This week or as needed.    Mariza Brink RN  Ambulatory     4/28/2020, 17:50

## 2020-04-30 ENCOUNTER — TELEPHONE (OUTPATIENT)
Dept: INTERNAL MEDICINE | Facility: CLINIC | Age: 30
End: 2020-04-30

## 2020-04-30 ENCOUNTER — PATIENT OUTREACH (OUTPATIENT)
Dept: CASE MANAGEMENT | Facility: OTHER | Age: 30
End: 2020-04-30

## 2020-04-30 NOTE — OUTREACH NOTE
"Care Coordination Note    Called patient's PCP, Dr. CLIFFORD Ordonez's office, spoke with her MA, Zoya.  Informed her of today's conversation with patient's brother informing him of Rehab options. Informed her of [facility]'s request from PCP for an order \"Evaluate for In-Patient Rehab\", along with last office visit note, recent H & P, Medication list, Allergies; all to be faxed to Ambar El, at [facility], at her E-fax # [phone].  Zoya said Dr. Ordonez is out of office this afternoon, but she will relay the information to Dr. Ordonez in the morning.      Mariza Brink RN  Ambulatory     4/30/2020, 15:07      "

## 2020-04-30 NOTE — OUTREACH NOTE
"Patient Outreach Note    Called patient's brother, Joey, back after obtaining some information about rehab for patient. Informed him of the following:     Per [facility] Admissions, Erik Aquino RN, they are not admitting patients from home at this time of the Covid-19 Pandemic; all admissions are from the hospitals.  She suggested family could call the [facility] to make an appt with one of the  Physicians from the Brain Injury Unit ([phone]) to schedule an appt for patient to be evaluated for whatever services they could offer patient., inpatient (when available) or outpatient.    Per [facility] inpatient ., Medicare is not requiring patients to stay 3 overnights in hospital as inpatient before allowing them to go to short term rehab in a SNF (only during this time of Covid-19 Pandemic).     Called one of [place] SN[facility], [facility]; spoke with , Parisa Mccall. They can admit from home normally, but right now with the Pandemic, their priority is admitting from the hospitals. No male beds available right now.  They would need patient's records from PCP office to review; need patient to have recent visit with PCP.    Called [facility] and [facility], a SNF in [place] who does short term rehab; spoke with Angelica in Admissions.  They can admit from home, but would need to review patient's records sent from PCP office including recent visit note, current H & P, Med list, Allergies.    Called [facility], in [place], [place] ([phone]; talked with Sienna, an Admission Liason.  Sienna's direct phone line is [phone], and she said to give the family her number.  They are an In-Patient Acute Rehab hospital; have private rooms; offer an average of 3 hours of rehab/day to patients. They can admit from home; can screen patient by talking with patient /family on the phone.  They would need PCP Order: \"Evaluate for In-Patient Rehab\". "  They need records faxed to Sienna from PCP office including last office note, recent H & P, Medication list, Allergies.  Order and records could be faxed to Sienna's E-fax at [phone].    Brother, Joey, voiced understanding and appreciation for the information.  He said [facility] and [facility].  Brother gave permission to give [facility] patient's name as well as Joey's name and number.  This note will be routed to PCP.    Mariza Brink RN  Ambulatory     4/30/2020, 13:44

## 2020-04-30 NOTE — OUTREACH NOTE
Care Coordination Note    Received call from [facility], Saundra, returning my call from earlier missed call.  Explained patient's desire for in-patient Therapy due to a decline in physical status and dexterity. She confirmed that they are not admitting patients from home to inpatient rehab, at this time, due to Pandemic, knowing things can change in days/weeks ahead.  Explained to Saundra that I gave Joey, the brother, the number for the Clinic to call to schedule an appt for patient to see one of the  Physicians from the Brain Injury Unit for a visit.  Saundra stated they need some paperwork from the PCP:  Demographics/ Insurance info, last PCP visit note, updated H & P, Medication list, Allergies- to be faxed to Saundra at [phone].  Told her I would request this of the PCP office.  This note will be routed to the PCP.    Mariza Brink RN  Ambulatory     4/30/2020, 16:47

## 2020-05-01 ENCOUNTER — TELEPHONE (OUTPATIENT)
Dept: INTERNAL MEDICINE | Facility: CLINIC | Age: 30
End: 2020-05-01

## 2020-05-01 DIAGNOSIS — T79.6XXS: ICD-10-CM

## 2020-05-01 DIAGNOSIS — M62.838 MUSCLE SPASTICITY: ICD-10-CM

## 2020-05-01 DIAGNOSIS — Z74.1 REQUIRES DAILY ASSISTANCE FOR ACTIVITIES OF DAILY LIVING (ADL) AND COMFORT NEEDS: ICD-10-CM

## 2020-05-01 DIAGNOSIS — G93.1 ANOXIC BRAIN DAMAGE (HCC): Primary | ICD-10-CM

## 2020-05-01 DIAGNOSIS — R53.1 WEAKNESS: ICD-10-CM

## 2020-05-01 NOTE — TELEPHONE ENCOUNTER
----- Message from Zoya Conti MA sent at 5/1/2020  7:49 AM EDT -----  Regarding: FW: SW Referral      ----- Message -----  From: Mariza Brink RN  Sent: 4/30/2020   4:56 PM EDT  To: Krysta Ordonez MD, #  Subject: SW Referral                                      In addition to previously asked fax of info to [facility]...    Additional faxing of patient's information requested by [facility], at [phone], Attn: Saundra  (See Note)  Thank you so much,    Mariza Brink RN, Ambulatory

## 2020-05-01 NOTE — TELEPHONE ENCOUNTER
"----- Message from Mariza Brink RN sent at 4/30/2020  2:13 PM EDT -----  Regarding: S.W. Referral   Dr. Ordonez,  Please see today's note from information gathered that the family requested regarding Rehab options for patient.  If possible today, [facility] ([place], [place]) would like your office to send them paper-work on patient to review for admission prior to talking to patient/famly. The E-fax # is in the note, and goes to Sienna in Admissions. They need latest office visit note, updated H & P, Medication list, Allergies.  They also need an order faxed to them from you for \"Evaluation for In-Patient Rehab\".  If I can be of further assistance, please let me know.  Thank you,  Mariza Brink RN, Ambulatory   "

## 2020-05-07 ENCOUNTER — PATIENT OUTREACH (OUTPATIENT)
Dept: CASE MANAGEMENT | Facility: OTHER | Age: 30
End: 2020-05-07

## 2020-05-07 ENCOUNTER — EPISODE CHANGES (OUTPATIENT)
Dept: CASE MANAGEMENT | Facility: OTHER | Age: 30
End: 2020-05-07

## 2020-05-07 NOTE — OUTREACH NOTE
Care Coordination Note    Received email from Sienna Zuniga RN, Lead Clinical Liaison with [facility] in [facility], asking me to call her.  Called Sienna back as requested. She said they received everything needed from patient's PCP; have a possible bed available tomorrow if patient wants to come there for Rehab; Sienna is unable to reach patient/ Joey the brother.  She said she has called and left messages for Joey, the brother, to call her back, and has not heard back from him.  Sienna said she last talked with Joey, the brother, on Tues., 5-5-20.  Verified with Sienna the phone number she is calling, and it is the only number listed for patient in Epic.       Mariza Brink RN  Ambulatory     5/7/2020, 15:47

## 2020-05-15 ENCOUNTER — PATIENT OUTREACH (OUTPATIENT)
Dept: CASE MANAGEMENT | Facility: OTHER | Age: 30
End: 2020-05-15

## 2020-05-15 NOTE — OUTREACH NOTE
Patient Outreach Note    Called patient's brother/caregiver, Joey, to follow up on their plans for Rehab for Patient.  Joey said they decided to wait on inpatient rehab due to the Corona Virus Pandemic; will look at doing that when the Pandemic settles down and they feel patient would be safer getting out.  Joey said the family will continue his exercises with him for now, and he will follow up with rehab options given him at a later date.  No other questions or concerns voiced at this time.      Mariza Brink RN  Ambulatory     5/15/2020, 15:08

## 2020-07-22 DIAGNOSIS — R56.9 SEIZURES (HCC): ICD-10-CM

## 2020-07-22 RX ORDER — LEVETIRACETAM 500 MG/1
TABLET ORAL
Qty: 180 TABLET | Refills: 0 | Status: SHIPPED | OUTPATIENT
Start: 2020-07-22 | End: 2020-11-20

## 2020-11-02 DIAGNOSIS — F41.9 ANXIETY: ICD-10-CM

## 2020-11-02 DIAGNOSIS — I10 NEUROGENIC HYPERTENSION: ICD-10-CM

## 2020-11-02 RX ORDER — PROPRANOLOL HYDROCHLORIDE 20 MG/1
TABLET ORAL
Qty: 180 TABLET | Refills: 0 | Status: SHIPPED | OUTPATIENT
Start: 2020-11-02

## 2020-11-19 DIAGNOSIS — R56.9 SEIZURES (HCC): ICD-10-CM

## 2020-11-20 DIAGNOSIS — R56.9 SEIZURES (HCC): ICD-10-CM

## 2020-11-20 RX ORDER — FLASH GLUCOSE SENSOR
1 KIT MISCELLANEOUS
Qty: 6 EACH | Refills: 3 | Status: SHIPPED | OUTPATIENT
Start: 2020-11-20

## 2020-11-20 RX ORDER — LEVETIRACETAM 500 MG/1
500 TABLET ORAL 2 TIMES DAILY
Qty: 180 TABLET | Refills: 0 | OUTPATIENT
Start: 2020-11-20

## 2020-11-20 RX ORDER — LEVETIRACETAM 500 MG/1
500 TABLET ORAL 2 TIMES DAILY
Qty: 180 TABLET | Refills: 0 | Status: SHIPPED | OUTPATIENT
Start: 2020-11-20 | End: 2022-05-05 | Stop reason: HOSPADM

## 2020-11-20 NOTE — TELEPHONE ENCOUNTER
.    Caller: DelgadoStephen    Relationship: Self    Best call back number: [phone]    Medication needed:   Requested Prescriptions     Pending Prescriptions Disp Refills   • levETIRAcetam (KEPPRA) 500 MG tablet 180 tablet 0     Sig: Take 1 tablet by mouth 2 (Two) Times a Day. Indications: Seizure   • Continuous Blood Gluc Sensor (FreeStyle Yanna 14 Day Sensor) misc 6 each 3     Si each Every 14 (Fourteen) Days.       When do you need the refill by: ASAP    What details did the patient provide when requesting the medication: 3 DAYS OF KEPPRA. OUT OF SENSOR    Does the patient have less than a 3 day supply:  [x] Yes  [] No    What is the patient's preferred pharmacy:      [facility] [address] [place] [address] [phone] [facility] [phone] FX

## 2020-12-21 DIAGNOSIS — R73.09 LABILE BLOOD GLUCOSE: ICD-10-CM

## 2020-12-21 RX ORDER — PEN NEEDLE, DIABETIC 32GX 5/32"
NEEDLE, DISPOSABLE MISCELLANEOUS
Qty: 300 EACH | Refills: 0 | Status: SHIPPED | OUTPATIENT
Start: 2020-12-21 | End: 2021-01-12

## 2021-01-12 DIAGNOSIS — R73.09 LABILE BLOOD GLUCOSE: ICD-10-CM

## 2021-01-12 DIAGNOSIS — F41.9 ANXIETY: ICD-10-CM

## 2021-01-12 RX ORDER — HYDROXYZINE HYDROCHLORIDE 25 MG/1
TABLET, FILM COATED ORAL
Qty: 270 TABLET | Refills: 0 | Status: SHIPPED | OUTPATIENT
Start: 2021-01-12 | End: 2022-05-05 | Stop reason: HOSPADM

## 2021-01-12 RX ORDER — CITALOPRAM 40 MG/1
TABLET ORAL
Qty: 90 TABLET | Refills: 0 | Status: SHIPPED | OUTPATIENT
Start: 2021-01-12

## 2021-01-12 RX ORDER — PEN NEEDLE, DIABETIC 32GX 5/32"
NEEDLE, DISPOSABLE MISCELLANEOUS
Qty: 300 EACH | Refills: 0 | Status: SHIPPED | OUTPATIENT
Start: 2021-01-12 | End: 2021-04-05

## 2021-04-05 DIAGNOSIS — R73.09 LABILE BLOOD GLUCOSE: ICD-10-CM

## 2021-04-05 RX ORDER — PEN NEEDLE, DIABETIC 32GX 5/32"
NEEDLE, DISPOSABLE MISCELLANEOUS
Qty: 300 EACH | Refills: 0 | Status: SHIPPED | OUTPATIENT
Start: 2021-04-05 | End: 2021-05-24

## 2021-04-22 ENCOUNTER — READMISSION MANAGEMENT (OUTPATIENT)
Dept: CALL CENTER | Facility: HOSPITAL | Age: 31
End: 2021-04-22

## 2021-04-22 NOTE — OUTREACH NOTE
Prep Survey      Responses   [facility] facility patient discharged from?  Non-BH   Is LACE score < 7 ?  Non-BH Discharge   Emergency Room discharge w/ pulse ox?  No   Eligibility  Not Eligible   [facility]   Date of Admission  04/12/21   Date of Discharge  04/22/21   What are the reasons patient is not eligible?  Other [Non-BH provider]   Discharge diagnosis  unavailable   Does the patient have one of the following disease processes/diagnoses(primary or secondary)?  Non-BH Discharge   Prep survey completed?  Yes          Christine Jaimes RN

## 2021-05-23 DIAGNOSIS — R73.09 LABILE BLOOD GLUCOSE: ICD-10-CM

## 2021-05-24 RX ORDER — PEN NEEDLE, DIABETIC 32GX 5/32"
NEEDLE, DISPOSABLE MISCELLANEOUS
Qty: 300 EACH | Refills: 0 | Status: SHIPPED | OUTPATIENT
Start: 2021-05-24 | End: 2021-07-16

## 2021-07-15 DIAGNOSIS — R73.09 LABILE BLOOD GLUCOSE: ICD-10-CM

## 2021-07-16 RX ORDER — PEN NEEDLE, DIABETIC 32GX 5/32"
NEEDLE, DISPOSABLE MISCELLANEOUS
Qty: 300 EACH | Refills: 0 | Status: SHIPPED | OUTPATIENT
Start: 2021-07-16

## 2022-02-25 ENCOUNTER — APPOINTMENT (OUTPATIENT)
Dept: CT IMAGING | Facility: HOSPITAL | Age: 32
End: 2022-02-25

## 2022-02-25 ENCOUNTER — HOSPITAL ENCOUNTER (EMERGENCY)
Facility: HOSPITAL | Age: 32
Discharge: HOME | End: 2022-02-26
Attending: EMERGENCY MEDICINE | Admitting: EMERGENCY MEDICINE

## 2022-02-25 ENCOUNTER — APPOINTMENT (OUTPATIENT)
Dept: GENERAL RADIOLOGY | Facility: HOSPITAL | Age: 32
End: 2022-02-25

## 2022-02-25 DIAGNOSIS — B34.9 VIRAL SYNDROME: Primary | ICD-10-CM

## 2022-02-25 LAB
ALBUMIN SERPL-MCNC: 4.4 G/DL (ref 3.5–5.2)
ALBUMIN/GLOB SERPL: 1.6 G/DL
ALP SERPL-CCNC: 80 U/L (ref 39–117)
ALT SERPL W P-5'-P-CCNC: 20 U/L (ref 1–41)
ANION GAP SERPL CALCULATED.3IONS-SCNC: 9.8 MMOL/L (ref 5–15)
AST SERPL-CCNC: 16 U/L (ref 1–40)
B PARAPERT DNA SPEC QL NAA+PROBE: NOT DETECTED
B PERT DNA SPEC QL NAA+PROBE: NOT DETECTED
BASOPHILS # BLD AUTO: 0.04 10*3/MM3 (ref 0–0.2)
BASOPHILS NFR BLD AUTO: 0.6 % (ref 0–1.5)
BILIRUB SERPL-MCNC: 1.7 MG/DL (ref 0–1.2)
BUN SERPL-MCNC: 14 MG/DL (ref 6–20)
BUN/CREAT SERPL: 15.2 (ref 7–25)
C PNEUM DNA NPH QL NAA+NON-PROBE: NOT DETECTED
CALCIUM SPEC-SCNC: 9.4 MG/DL (ref 8.6–10.5)
CHLORIDE SERPL-SCNC: 97 MMOL/L (ref 98–107)
CO2 SERPL-SCNC: 30.2 MMOL/L (ref 22–29)
CREAT SERPL-MCNC: 0.92 MG/DL (ref 0.76–1.27)
DEPRECATED RDW RBC AUTO: 38.5 FL (ref 37–54)
EOSINOPHIL # BLD AUTO: 0.15 10*3/MM3 (ref 0–0.4)
EOSINOPHIL NFR BLD AUTO: 2.3 % (ref 0.3–6.2)
ERYTHROCYTE [DISTWIDTH] IN BLOOD BY AUTOMATED COUNT: 12.3 % (ref 12.3–15.4)
FLUAV SUBTYP SPEC NAA+PROBE: NOT DETECTED
FLUBV RNA ISLT QL NAA+PROBE: NOT DETECTED
GFR SERPL CREATININE-BSD FRML MDRD: 96 ML/MIN/1.73
GLOBULIN UR ELPH-MCNC: 2.8 GM/DL
GLUCOSE SERPL-MCNC: 277 MG/DL (ref 65–99)
HADV DNA SPEC NAA+PROBE: NOT DETECTED
HCOV 229E RNA SPEC QL NAA+PROBE: DETECTED
HCOV HKU1 RNA SPEC QL NAA+PROBE: NOT DETECTED
HCOV NL63 RNA SPEC QL NAA+PROBE: NOT DETECTED
HCOV OC43 RNA SPEC QL NAA+PROBE: NOT DETECTED
HCT VFR BLD AUTO: 41.7 % (ref 37.5–51)
HGB BLD-MCNC: 14.8 G/DL (ref 13–17.7)
HMPV RNA NPH QL NAA+NON-PROBE: NOT DETECTED
HPIV1 RNA ISLT QL NAA+PROBE: NOT DETECTED
HPIV2 RNA SPEC QL NAA+PROBE: NOT DETECTED
HPIV3 RNA NPH QL NAA+PROBE: NOT DETECTED
HPIV4 P GENE NPH QL NAA+PROBE: NOT DETECTED
IMM GRANULOCYTES # BLD AUTO: 0.02 10*3/MM3 (ref 0–0.05)
IMM GRANULOCYTES NFR BLD AUTO: 0.3 % (ref 0–0.5)
LIPASE SERPL-CCNC: 22 U/L (ref 13–60)
LYMPHOCYTES # BLD AUTO: 1.26 10*3/MM3 (ref 0.7–3.1)
LYMPHOCYTES NFR BLD AUTO: 19 % (ref 19.6–45.3)
M PNEUMO IGG SER IA-ACNC: NOT DETECTED
MAGNESIUM SERPL-MCNC: 1.9 MG/DL (ref 1.6–2.6)
MCH RBC QN AUTO: 30.4 PG (ref 26.6–33)
MCHC RBC AUTO-ENTMCNC: 35.5 G/DL (ref 31.5–35.7)
MCV RBC AUTO: 85.6 FL (ref 79–97)
MONOCYTES # BLD AUTO: 0.55 10*3/MM3 (ref 0.1–0.9)
MONOCYTES NFR BLD AUTO: 8.3 % (ref 5–12)
NEUTROPHILS NFR BLD AUTO: 4.6 10*3/MM3 (ref 1.7–7)
NEUTROPHILS NFR BLD AUTO: 69.5 % (ref 42.7–76)
NRBC BLD AUTO-RTO: 0 /100 WBC (ref 0–0.2)
PLATELET # BLD AUTO: 208 10*3/MM3 (ref 140–450)
PMV BLD AUTO: 8.9 FL (ref 6–12)
POTASSIUM SERPL-SCNC: 4.3 MMOL/L (ref 3.5–5.2)
PROT SERPL-MCNC: 7.2 G/DL (ref 6–8.5)
RBC # BLD AUTO: 4.87 10*6/MM3 (ref 4.14–5.8)
RHINOVIRUS RNA SPEC NAA+PROBE: NOT DETECTED
RSV RNA NPH QL NAA+NON-PROBE: NOT DETECTED
SARS-COV-2 RNA NPH QL NAA+NON-PROBE: NOT DETECTED
SODIUM SERPL-SCNC: 137 MMOL/L (ref 136–145)
TROPONIN T SERPL-MCNC: <0.01 NG/ML (ref 0–0.03)
WBC NRBC COR # BLD: 6.62 10*3/MM3 (ref 3.4–10.8)

## 2022-02-25 PROCEDURE — 96374 THER/PROPH/DIAG INJ IV PUSH: CPT

## 2022-02-25 PROCEDURE — 0202U NFCT DS 22 TRGT SARS-COV-2: CPT | Performed by: PHYSICIAN ASSISTANT

## 2022-02-25 PROCEDURE — 81003 URINALYSIS AUTO W/O SCOPE: CPT | Performed by: PHYSICIAN ASSISTANT

## 2022-02-25 PROCEDURE — 84484 ASSAY OF TROPONIN QUANT: CPT | Performed by: PHYSICIAN ASSISTANT

## 2022-02-25 PROCEDURE — 71045 X-RAY EXAM CHEST 1 VIEW: CPT

## 2022-02-25 PROCEDURE — 74176 CT ABD & PELVIS W/O CONTRAST: CPT

## 2022-02-25 PROCEDURE — 25010000002 KETOROLAC TROMETHAMINE PER 15 MG: Performed by: PHYSICIAN ASSISTANT

## 2022-02-25 PROCEDURE — 80053 COMPREHEN METABOLIC PANEL: CPT | Performed by: PHYSICIAN ASSISTANT

## 2022-02-25 PROCEDURE — 93005 ELECTROCARDIOGRAM TRACING: CPT | Performed by: PHYSICIAN ASSISTANT

## 2022-02-25 PROCEDURE — 85025 COMPLETE CBC W/AUTO DIFF WBC: CPT | Performed by: PHYSICIAN ASSISTANT

## 2022-02-25 PROCEDURE — 83735 ASSAY OF MAGNESIUM: CPT | Performed by: PHYSICIAN ASSISTANT

## 2022-02-25 PROCEDURE — 70450 CT HEAD/BRAIN W/O DYE: CPT

## 2022-02-25 PROCEDURE — 99283 EMERGENCY DEPT VISIT LOW MDM: CPT

## 2022-02-25 PROCEDURE — 25010000002 ONDANSETRON PER 1 MG: Performed by: PHYSICIAN ASSISTANT

## 2022-02-25 PROCEDURE — 83690 ASSAY OF LIPASE: CPT | Performed by: PHYSICIAN ASSISTANT

## 2022-02-25 PROCEDURE — 96375 TX/PRO/DX INJ NEW DRUG ADDON: CPT

## 2022-02-25 RX ORDER — HYDROXYZINE PAMOATE 25 MG/1
25 CAPSULE ORAL 3 TIMES DAILY PRN
Status: ON HOLD | COMMUNITY
End: 2022-04-28

## 2022-02-25 RX ORDER — KETOROLAC TROMETHAMINE 30 MG/ML
15 INJECTION, SOLUTION INTRAMUSCULAR; INTRAVENOUS ONCE
Status: COMPLETED | OUTPATIENT
Start: 2022-02-25 | End: 2022-02-25

## 2022-02-25 RX ORDER — SODIUM CHLORIDE 0.9 % (FLUSH) 0.9 %
10 SYRINGE (ML) INJECTION AS NEEDED
Status: DISCONTINUED | OUTPATIENT
Start: 2022-02-25 | End: 2022-02-26 | Stop reason: HOSPADM

## 2022-02-25 RX ORDER — TRIHEXYPHENIDYL HYDROCHLORIDE 2 MG/1
2 TABLET ORAL
COMMUNITY

## 2022-02-25 RX ORDER — ONDANSETRON 2 MG/ML
4 INJECTION INTRAMUSCULAR; INTRAVENOUS ONCE
Status: COMPLETED | OUTPATIENT
Start: 2022-02-25 | End: 2022-02-25

## 2022-02-25 RX ADMIN — SODIUM CHLORIDE 500 ML: 9 INJECTION, SOLUTION INTRAVENOUS at 22:29

## 2022-02-25 RX ADMIN — ONDANSETRON 4 MG: 2 INJECTION INTRAMUSCULAR; INTRAVENOUS at 22:29

## 2022-02-25 RX ADMIN — KETOROLAC TROMETHAMINE 15 MG: 30 INJECTION, SOLUTION INTRAMUSCULAR; INTRAVENOUS at 23:06

## 2022-02-26 VITALS
RESPIRATION RATE: 18 BRPM | HEART RATE: 88 BPM | DIASTOLIC BLOOD PRESSURE: 60 MMHG | WEIGHT: 115 LBS | TEMPERATURE: 98.5 F | SYSTOLIC BLOOD PRESSURE: 122 MMHG | OXYGEN SATURATION: 96 % | BODY MASS INDEX: 16.1 KG/M2 | HEIGHT: 71 IN

## 2022-02-26 LAB
BILIRUB UR QL STRIP: NEGATIVE
CLARITY UR: CLEAR
COLOR UR: YELLOW
GLUCOSE UR STRIP-MCNC: ABNORMAL MG/DL
HGB UR QL STRIP.AUTO: NEGATIVE
KETONES UR QL STRIP: NEGATIVE
LEUKOCYTE ESTERASE UR QL STRIP.AUTO: NEGATIVE
NITRITE UR QL STRIP: NEGATIVE
PH UR STRIP.AUTO: 5.5 [PH] (ref 5–8)
PROT UR QL STRIP: NEGATIVE
SP GR UR STRIP: 1.01 (ref 1–1.03)
UROBILINOGEN UR QL STRIP: ABNORMAL

## 2022-02-26 RX ORDER — ONDANSETRON 4 MG/1
4 TABLET, ORALLY DISINTEGRATING ORAL EVERY 8 HOURS PRN
Qty: 12 TABLET | Refills: 0 | Status: ON HOLD | OUTPATIENT
Start: 2022-02-26 | End: 2022-04-28

## 2022-04-20 ENCOUNTER — HOSPITAL ENCOUNTER (EMERGENCY)
Facility: HOSPITAL | Age: 32
Discharge: NURSING, REHAB OR LONG-TERM CARE | End: 2022-04-20
Attending: EMERGENCY MEDICINE | Admitting: EMERGENCY MEDICINE

## 2022-04-20 VITALS
DIASTOLIC BLOOD PRESSURE: 71 MMHG | HEART RATE: 80 BPM | TEMPERATURE: 99.2 F | BODY MASS INDEX: 16.19 KG/M2 | WEIGHT: 115.6 LBS | HEIGHT: 71 IN | RESPIRATION RATE: 16 BRPM | SYSTOLIC BLOOD PRESSURE: 121 MMHG | OXYGEN SATURATION: 96 %

## 2022-04-20 DIAGNOSIS — Z02.2 ENCOUNTER FOR EXAMINATION FOR ADMISSION TO ASSISTED LIVING FACILITY: Primary | ICD-10-CM

## 2022-04-20 LAB
ALBUMIN SERPL-MCNC: 4.7 G/DL (ref 3.5–5.2)
ALBUMIN/GLOB SERPL: 1.7 G/DL
ALP SERPL-CCNC: 88 U/L (ref 39–117)
ALT SERPL W P-5'-P-CCNC: 19 U/L (ref 1–41)
ANION GAP SERPL CALCULATED.3IONS-SCNC: 10 MMOL/L (ref 5–15)
AST SERPL-CCNC: 20 U/L (ref 1–40)
BASOPHILS # BLD AUTO: 0.06 10*3/MM3 (ref 0–0.2)
BASOPHILS NFR BLD AUTO: 1 % (ref 0–1.5)
BILIRUB SERPL-MCNC: 1.4 MG/DL (ref 0–1.2)
BILIRUB UR QL STRIP: NEGATIVE
BUN SERPL-MCNC: 22 MG/DL (ref 6–20)
BUN/CREAT SERPL: 23.9 (ref 7–25)
CALCIUM SPEC-SCNC: 9.7 MG/DL (ref 8.6–10.5)
CHLORIDE SERPL-SCNC: 94 MMOL/L (ref 98–107)
CLARITY UR: CLEAR
CO2 SERPL-SCNC: 29 MMOL/L (ref 22–29)
COLOR UR: YELLOW
CREAT SERPL-MCNC: 0.92 MG/DL (ref 0.76–1.27)
DEPRECATED RDW RBC AUTO: 37.3 FL (ref 37–54)
EGFRCR SERPLBLD CKD-EPI 2021: 114.1 ML/MIN/1.73
EOSINOPHIL # BLD AUTO: 0.1 10*3/MM3 (ref 0–0.4)
EOSINOPHIL NFR BLD AUTO: 1.6 % (ref 0.3–6.2)
ERYTHROCYTE [DISTWIDTH] IN BLOOD BY AUTOMATED COUNT: 12 % (ref 12.3–15.4)
GLOBULIN UR ELPH-MCNC: 2.7 GM/DL
GLUCOSE SERPL-MCNC: 345 MG/DL (ref 65–99)
GLUCOSE UR STRIP-MCNC: ABNORMAL MG/DL
HCT VFR BLD AUTO: 44.9 % (ref 37.5–51)
HGB BLD-MCNC: 15.9 G/DL (ref 13–17.7)
HGB UR QL STRIP.AUTO: NEGATIVE
IMM GRANULOCYTES # BLD AUTO: 0.01 10*3/MM3 (ref 0–0.05)
IMM GRANULOCYTES NFR BLD AUTO: 0.2 % (ref 0–0.5)
KETONES UR QL STRIP: ABNORMAL
LEUKOCYTE ESTERASE UR QL STRIP.AUTO: NEGATIVE
LIPASE SERPL-CCNC: 28 U/L (ref 13–60)
LYMPHOCYTES # BLD AUTO: 1.56 10*3/MM3 (ref 0.7–3.1)
LYMPHOCYTES NFR BLD AUTO: 24.8 % (ref 19.6–45.3)
MAGNESIUM SERPL-MCNC: 2.1 MG/DL (ref 1.6–2.6)
MCH RBC QN AUTO: 30.3 PG (ref 26.6–33)
MCHC RBC AUTO-ENTMCNC: 35.4 G/DL (ref 31.5–35.7)
MCV RBC AUTO: 85.5 FL (ref 79–97)
MONOCYTES # BLD AUTO: 0.56 10*3/MM3 (ref 0.1–0.9)
MONOCYTES NFR BLD AUTO: 8.9 % (ref 5–12)
NEUTROPHILS NFR BLD AUTO: 4 10*3/MM3 (ref 1.7–7)
NEUTROPHILS NFR BLD AUTO: 63.5 % (ref 42.7–76)
NITRITE UR QL STRIP: NEGATIVE
NRBC BLD AUTO-RTO: 0 /100 WBC (ref 0–0.2)
PH UR STRIP.AUTO: 5.5 [PH] (ref 5–8)
PLATELET # BLD AUTO: 277 10*3/MM3 (ref 140–450)
PMV BLD AUTO: 9.3 FL (ref 6–12)
POTASSIUM SERPL-SCNC: 4.6 MMOL/L (ref 3.5–5.2)
PROT SERPL-MCNC: 7.4 G/DL (ref 6–8.5)
PROT UR QL STRIP: NEGATIVE
RBC # BLD AUTO: 5.25 10*6/MM3 (ref 4.14–5.8)
SODIUM SERPL-SCNC: 133 MMOL/L (ref 136–145)
SP GR UR STRIP: >=1.03 (ref 1–1.03)
UROBILINOGEN UR QL STRIP: ABNORMAL
WBC NRBC COR # BLD: 6.29 10*3/MM3 (ref 3.4–10.8)

## 2022-04-20 PROCEDURE — 83735 ASSAY OF MAGNESIUM: CPT | Performed by: PHYSICIAN ASSISTANT

## 2022-04-20 PROCEDURE — 99283 EMERGENCY DEPT VISIT LOW MDM: CPT

## 2022-04-20 PROCEDURE — 80053 COMPREHEN METABOLIC PANEL: CPT | Performed by: PHYSICIAN ASSISTANT

## 2022-04-20 PROCEDURE — 93005 ELECTROCARDIOGRAM TRACING: CPT | Performed by: PHYSICIAN ASSISTANT

## 2022-04-20 PROCEDURE — 83690 ASSAY OF LIPASE: CPT | Performed by: PHYSICIAN ASSISTANT

## 2022-04-20 PROCEDURE — 85025 COMPLETE CBC W/AUTO DIFF WBC: CPT | Performed by: PHYSICIAN ASSISTANT

## 2022-04-20 PROCEDURE — 81003 URINALYSIS AUTO W/O SCOPE: CPT | Performed by: PHYSICIAN ASSISTANT

## 2022-04-20 RX ORDER — SODIUM CHLORIDE 0.9 % (FLUSH) 0.9 %
10 SYRINGE (ML) INJECTION AS NEEDED
Status: DISCONTINUED | OUTPATIENT
Start: 2022-04-20 | End: 2022-04-20 | Stop reason: HOSPADM

## 2022-04-20 RX ADMIN — SODIUM CHLORIDE 1000 ML: 9 INJECTION, SOLUTION INTRAVENOUS at 19:46

## 2022-04-20 NOTE — ED TRIAGE NOTES
Pt brother states he is unable to care for pt in the way he needs. Pt brother states he is wanting to have pt placed in a skilled facility. Pt brother states the pcp told them to come to the er and see if we could help find placement for pt.

## 2022-04-20 NOTE — ED PROVIDER NOTES
"Subjective   Chief Complaint: Questing placement in skilled nursing facility  History of Present Illness: 31-year-old male with a history of alcohol dependence, anoxic brain injury, DKA in the past, Hirschsprung's disease, \"shock liver\" and type 1 diabetes on insulin comes in for evaluation above complaint.  Brother takes care of him and states he can no longer do this.  PCP has been trying to help with placing skilled nursing facility but has been unsuccessful.  Also patients family has tried to go through a provider that they knew at [facility] but again unsuccessful in getting patient placed.  Patient's brother states he was told by the patient's PCP \"that if we bring him to the ER we can cut through all the red tape and probably get him admitted to a skilled nursing facility easier from the ER than they can outpatient.\"  no new symptoms recently, patient has chronic pain in his bilateral feet and toes as well as hands due to peripheral neuropathy and contractures.  No recent falls or injuries.  No recent illness.  No real changes from baseline per patient's brother.  Onset: Chronic ongoing  Exacerbating / Alleviating factors: None  Associated symptoms: None      Nurses Notes reviewed and agree, including vitals, allergies, social history and prior medical history.          Review of Systems   Constitutional:        Requesting placement in skilled nursing facility   HENT: Negative.    Eyes: Negative.    Respiratory: Negative.    Cardiovascular: Negative.    Gastrointestinal: Negative.    Genitourinary: Negative.    Musculoskeletal: Negative.    Skin: Negative.    Allergic/Immunologic: Negative.    Neurological: Negative.    Psychiatric/Behavioral: Negative.    All other systems reviewed and are negative.      Past Medical History:   Diagnosis Date   • Alcohol dependence in remission (HCC)    • Anoxic brain damage (HCC)    • Anoxic brain injury (HCC)    • Cardiac arrest due to other underlying condition (HCC) " 03/15/2017   • DKA (diabetic ketoacidosis) (HCC)    • G tube feedings (HCC)    • Hirschsprung's disease    • Opioid abuse (HCC)    • Shock liver 03/15/2017   • Type 1 diabetes mellitus on insulin therapy (HCC)        Allergies   Allergen Reactions   • Penicillins Hives     hives       Past Surgical History:   Procedure Laterality Date   • COLOSTOMY     • KNEE ACL RECONSTRUCTION Right    • TRACHEOSTOMY  03/2017       Family History   Problem Relation Age of Onset   • Arthritis Maternal Aunt    • Diabetes Maternal Aunt    • Heart attack Maternal Grandfather    • Diabetes Cousin        Social History     Socioeconomic History   • Marital status: Single   Tobacco Use   • Smoking status: Never Smoker   • Smokeless tobacco: Never Used   Substance and Sexual Activity   • Alcohol use: No     Comment: former   • Drug use: No     Comment: former, opiates   • Sexual activity: Defer           Objective   Physical Exam  Vitals and nursing note reviewed.   Constitutional:       General: He is not in acute distress.     Appearance: He is not ill-appearing, toxic-appearing or diaphoretic.      Comments: Thin, frail   HENT:      Head: Normocephalic and atraumatic.      Nose: Nose normal.   Cardiovascular:      Rate and Rhythm: Regular rhythm. Tachycardia present.   Pulmonary:      Effort: Pulmonary effort is normal.      Breath sounds: Normal breath sounds.   Abdominal:      General: Abdomen is flat. Bowel sounds are normal.      Palpations: Abdomen is soft.      Tenderness: There is no abdominal tenderness.   Musculoskeletal:         General: Normal range of motion.      Cervical back: Normal range of motion.   Skin:     General: Skin is warm and dry.   Neurological:      General: No focal deficit present.      Mental Status: He is alert. Mental status is at baseline.   Psychiatric:         Mood and Affect: Mood normal.         Behavior: Behavior normal.         Procedures           ED Course  ED Course as of 04/20/22 2131 Wed  Apr 20, 2022 2000 EKG interpreted by me: Sinus rhythm, normal rate, no acute ST/T changes, RV conduction delay, this is a borderline EKG [MP]      ED Course User Index  [MP] Dov Guerrier MD                                                 [facility]  Patient smiling verbalizing per his norm, no acute distress, no complaints other than chronic pain.  Discussed with hospitalist, Dr. Wilburn, no indication for admission.  Discussed with patient's brother.  He understands this, we will place a social work consult so case management can help arrange for placement outpatient.  Final diagnoses:   Encounter for examination for admission to assisted living facility       ED Disposition  ED Disposition     ED Disposition   Discharge    Condition   Stable    Comment   --             Caryl Mayer, APRN  [address] [id number] [phone]    Schedule an appointment as soon as possible for a visit            Medication List      No changes were made to your prescriptions during this visit.          Viet Burns PA-C  04/20/22 2096

## 2022-04-21 NOTE — CASE MANAGEMENT/SOCIAL WORK
Case Management/Social Work    Patient Name:  Stephen Delgado  YOB: 1990  MRN: [id number]  Admit Date:  4/20/2022     spoke with nurse regarding pt and his need for LTC. Pt has been being cared for by a family member and the family member is exhausted and is not going to be able to continue to care his family member.  explained that since patient is not being admitted it would be a btit more difficult to get him into a facility but not entirely possible.  inquired about whether or not the family had communicated with the pt's PCP regarding this situation. Nurse explains that the family has actually already done this and was reportedly told by the pt's PCP to come to the ER.  will f/u with pt's family within 24-48 hours.     Electronically signed by:  Maria Del Carmen Pitt  04/20/22 21:39 EDT

## 2022-04-21 NOTE — ED NOTES
Discussed pt's situation with , Maria Del Carmen. She will follow up with pt and brother tomorrow to facilitate further care options.

## 2022-04-26 ENCOUNTER — TRANSCRIBE ORDERS (OUTPATIENT)
Dept: ADMINISTRATIVE | Facility: HOSPITAL | Age: 32
End: 2022-04-26

## 2022-04-26 DIAGNOSIS — E10.9 TYPE 1 DIABETES MELLITUS WITHOUT COMPLICATION: Primary | ICD-10-CM

## 2022-04-27 ENCOUNTER — APPOINTMENT (OUTPATIENT)
Dept: GENERAL RADIOLOGY | Facility: HOSPITAL | Age: 32
End: 2022-04-27

## 2022-04-27 ENCOUNTER — HOSPITAL ENCOUNTER (OUTPATIENT)
Facility: HOSPITAL | Age: 32
Setting detail: OBSERVATION
Discharge: NURSING, REHAB OR LONG-TERM CARE | End: 2022-05-05
Attending: FAMILY MEDICINE | Admitting: INTERNAL MEDICINE

## 2022-04-27 DIAGNOSIS — E10.65 TYPE 1 DIABETES MELLITUS WITH HYPERGLYCEMIA: ICD-10-CM

## 2022-04-27 DIAGNOSIS — Z86.69 HISTORY OF ANOXIC BRAIN INJURY: ICD-10-CM

## 2022-04-27 DIAGNOSIS — R07.9 CHEST PAIN IN ADULT: Primary | ICD-10-CM

## 2022-04-27 LAB
ALBUMIN SERPL-MCNC: 4.8 G/DL (ref 3.5–5.2)
ALBUMIN/GLOB SERPL: 1.8 G/DL
ALP SERPL-CCNC: 100 U/L (ref 39–117)
ALT SERPL W P-5'-P-CCNC: 15 U/L (ref 1–41)
ANION GAP SERPL CALCULATED.3IONS-SCNC: 9.5 MMOL/L (ref 5–15)
AST SERPL-CCNC: 19 U/L (ref 1–40)
BASOPHILS # BLD AUTO: 0.05 10*3/MM3 (ref 0–0.2)
BASOPHILS NFR BLD AUTO: 0.8 % (ref 0–1.5)
BILIRUB SERPL-MCNC: 1 MG/DL (ref 0–1.2)
BUN SERPL-MCNC: 13 MG/DL (ref 6–20)
BUN/CREAT SERPL: 13 (ref 7–25)
CALCIUM SPEC-SCNC: 9.7 MG/DL (ref 8.6–10.5)
CHLORIDE SERPL-SCNC: 95 MMOL/L (ref 98–107)
CO2 SERPL-SCNC: 30.5 MMOL/L (ref 22–29)
CREAT SERPL-MCNC: 1 MG/DL (ref 0.76–1.27)
DEPRECATED RDW RBC AUTO: 38.2 FL (ref 37–54)
EGFRCR SERPLBLD CKD-EPI 2021: 103.2 ML/MIN/1.73
EOSINOPHIL # BLD AUTO: 0.12 10*3/MM3 (ref 0–0.4)
EOSINOPHIL NFR BLD AUTO: 2 % (ref 0.3–6.2)
ERYTHROCYTE [DISTWIDTH] IN BLOOD BY AUTOMATED COUNT: 12.2 % (ref 12.3–15.4)
GLOBULIN UR ELPH-MCNC: 2.6 GM/DL
GLUCOSE SERPL-MCNC: 308 MG/DL (ref 65–99)
HCT VFR BLD AUTO: 44 % (ref 37.5–51)
HGB BLD-MCNC: 15.7 G/DL (ref 13–17.7)
IMM GRANULOCYTES # BLD AUTO: 0.01 10*3/MM3 (ref 0–0.05)
IMM GRANULOCYTES NFR BLD AUTO: 0.2 % (ref 0–0.5)
LIPASE SERPL-CCNC: 40 U/L (ref 13–60)
LYMPHOCYTES # BLD AUTO: 1.9 10*3/MM3 (ref 0.7–3.1)
LYMPHOCYTES NFR BLD AUTO: 31.7 % (ref 19.6–45.3)
MCH RBC QN AUTO: 30.4 PG (ref 26.6–33)
MCHC RBC AUTO-ENTMCNC: 35.7 G/DL (ref 31.5–35.7)
MCV RBC AUTO: 85.3 FL (ref 79–97)
MONOCYTES # BLD AUTO: 0.45 10*3/MM3 (ref 0.1–0.9)
MONOCYTES NFR BLD AUTO: 7.5 % (ref 5–12)
NEUTROPHILS NFR BLD AUTO: 3.46 10*3/MM3 (ref 1.7–7)
NEUTROPHILS NFR BLD AUTO: 57.8 % (ref 42.7–76)
NRBC BLD AUTO-RTO: 0 /100 WBC (ref 0–0.2)
PLATELET # BLD AUTO: 264 10*3/MM3 (ref 140–450)
PMV BLD AUTO: 9.2 FL (ref 6–12)
POTASSIUM SERPL-SCNC: 4.7 MMOL/L (ref 3.5–5.2)
PROT SERPL-MCNC: 7.4 G/DL (ref 6–8.5)
RBC # BLD AUTO: 5.16 10*6/MM3 (ref 4.14–5.8)
SODIUM SERPL-SCNC: 135 MMOL/L (ref 136–145)
TROPONIN T SERPL-MCNC: <0.01 NG/ML (ref 0–0.03)
WBC NRBC COR # BLD: 5.99 10*3/MM3 (ref 3.4–10.8)

## 2022-04-27 PROCEDURE — 84484 ASSAY OF TROPONIN QUANT: CPT | Performed by: FAMILY MEDICINE

## 2022-04-27 PROCEDURE — 71045 X-RAY EXAM CHEST 1 VIEW: CPT

## 2022-04-27 PROCEDURE — 93005 ELECTROCARDIOGRAM TRACING: CPT | Performed by: FAMILY MEDICINE

## 2022-04-27 PROCEDURE — 80053 COMPREHEN METABOLIC PANEL: CPT | Performed by: FAMILY MEDICINE

## 2022-04-27 PROCEDURE — 83690 ASSAY OF LIPASE: CPT | Performed by: FAMILY MEDICINE

## 2022-04-27 PROCEDURE — 85025 COMPLETE CBC W/AUTO DIFF WBC: CPT | Performed by: FAMILY MEDICINE

## 2022-04-27 PROCEDURE — 99285 EMERGENCY DEPT VISIT HI MDM: CPT

## 2022-04-27 PROCEDURE — P9612 CATHETERIZE FOR URINE SPEC: HCPCS

## 2022-04-27 PROCEDURE — G0378 HOSPITAL OBSERVATION PER HR: HCPCS

## 2022-04-27 RX ORDER — SODIUM CHLORIDE 0.9 % (FLUSH) 0.9 %
10 SYRINGE (ML) INJECTION AS NEEDED
Status: DISCONTINUED | OUTPATIENT
Start: 2022-04-27 | End: 2022-05-05 | Stop reason: HOSPADM

## 2022-04-28 PROBLEM — R07.9 CHEST PAIN IN ADULT: Status: ACTIVE | Noted: 2022-04-28

## 2022-04-28 LAB
BILIRUB UR QL STRIP: NEGATIVE
CLARITY UR: CLEAR
COLOR UR: YELLOW
GLUCOSE BLDC GLUCOMTR-MCNC: 264 MG/DL (ref 70–130)
GLUCOSE BLDC GLUCOMTR-MCNC: 321 MG/DL (ref 70–130)
GLUCOSE BLDC GLUCOMTR-MCNC: 387 MG/DL (ref 70–130)
GLUCOSE BLDC GLUCOMTR-MCNC: 88 MG/DL (ref 70–130)
GLUCOSE UR STRIP-MCNC: ABNORMAL MG/DL
HBA1C MFR BLD: 9.2 % (ref 4.8–5.6)
HGB UR QL STRIP.AUTO: NEGATIVE
HOLD SPECIMEN: NORMAL
HOLD SPECIMEN: NORMAL
KETONES UR QL STRIP: NEGATIVE
LEUKOCYTE ESTERASE UR QL STRIP.AUTO: NEGATIVE
NITRITE UR QL STRIP: NEGATIVE
PH UR STRIP.AUTO: 6.5 [PH] (ref 5–8)
PROT UR QL STRIP: NEGATIVE
SARS-COV-2 RNA PNL SPEC NAA+PROBE: NOT DETECTED
SP GR UR STRIP: 1.01 (ref 1–1.03)
TROPONIN T SERPL-MCNC: <0.01 NG/ML (ref 0–0.03)
UROBILINOGEN UR QL STRIP: ABNORMAL
WHOLE BLOOD HOLD SPECIMEN: NORMAL
WHOLE BLOOD HOLD SPECIMEN: NORMAL

## 2022-04-28 PROCEDURE — G0378 HOSPITAL OBSERVATION PER HR: HCPCS

## 2022-04-28 PROCEDURE — 99219 PR INITIAL OBSERVATION CARE/DAY 50 MINUTES: CPT | Performed by: NURSE PRACTITIONER

## 2022-04-28 PROCEDURE — C9803 HOPD COVID-19 SPEC COLLECT: HCPCS

## 2022-04-28 PROCEDURE — 82962 GLUCOSE BLOOD TEST: CPT

## 2022-04-28 PROCEDURE — 83036 HEMOGLOBIN GLYCOSYLATED A1C: CPT | Performed by: NURSE PRACTITIONER

## 2022-04-28 PROCEDURE — 84484 ASSAY OF TROPONIN QUANT: CPT | Performed by: FAMILY MEDICINE

## 2022-04-28 PROCEDURE — 81003 URINALYSIS AUTO W/O SCOPE: CPT | Performed by: FAMILY MEDICINE

## 2022-04-28 PROCEDURE — 25010000002 ENOXAPARIN PER 10 MG: Performed by: NURSE PRACTITIONER

## 2022-04-28 PROCEDURE — 87635 SARS-COV-2 COVID-19 AMP PRB: CPT | Performed by: EMERGENCY MEDICINE

## 2022-04-28 PROCEDURE — 96372 THER/PROPH/DIAG INJ SC/IM: CPT

## 2022-04-28 PROCEDURE — 63710000001 INSULIN ASPART PER 5 UNITS: Performed by: EMERGENCY MEDICINE

## 2022-04-28 RX ORDER — NICOTINE POLACRILEX 4 MG
1 LOZENGE BUCCAL
Status: DISCONTINUED | OUTPATIENT
Start: 2022-04-28 | End: 2022-05-05 | Stop reason: HOSPADM

## 2022-04-28 RX ORDER — BACLOFEN 10 MG/1
20 TABLET ORAL 3 TIMES DAILY
Status: DISCONTINUED | OUTPATIENT
Start: 2022-04-28 | End: 2022-04-28

## 2022-04-28 RX ORDER — LEVETIRACETAM 500 MG/1
500 TABLET ORAL 2 TIMES DAILY
Status: DISCONTINUED | OUTPATIENT
Start: 2022-04-28 | End: 2022-05-05 | Stop reason: HOSPADM

## 2022-04-28 RX ORDER — SODIUM CHLORIDE 0.9 % (FLUSH) 0.9 %
10 SYRINGE (ML) INJECTION EVERY 12 HOURS SCHEDULED
Status: DISCONTINUED | OUTPATIENT
Start: 2022-04-28 | End: 2022-05-05 | Stop reason: HOSPADM

## 2022-04-28 RX ORDER — ACETAMINOPHEN 325 MG/1
650 TABLET ORAL EVERY 4 HOURS PRN
Status: DISCONTINUED | OUTPATIENT
Start: 2022-04-28 | End: 2022-05-05 | Stop reason: HOSPADM

## 2022-04-28 RX ORDER — INSULIN ASPART 100 [IU]/ML
6 INJECTION, SOLUTION INTRAVENOUS; SUBCUTANEOUS ONCE
Status: COMPLETED | OUTPATIENT
Start: 2022-04-28 | End: 2022-04-28

## 2022-04-28 RX ORDER — HYDROXYZINE HYDROCHLORIDE 25 MG/1
25 TABLET, FILM COATED ORAL 3 TIMES DAILY PRN
Status: DISCONTINUED | OUTPATIENT
Start: 2022-04-28 | End: 2022-05-05 | Stop reason: HOSPADM

## 2022-04-28 RX ORDER — ENOXAPARIN SODIUM 100 MG/ML
40 INJECTION SUBCUTANEOUS EVERY 24 HOURS
Status: DISCONTINUED | OUTPATIENT
Start: 2022-04-28 | End: 2022-05-05 | Stop reason: HOSPADM

## 2022-04-28 RX ORDER — PROPRANOLOL HYDROCHLORIDE 20 MG/1
20 TABLET ORAL 3 TIMES DAILY
Status: DISCONTINUED | OUTPATIENT
Start: 2022-04-28 | End: 2022-05-05 | Stop reason: HOSPADM

## 2022-04-28 RX ORDER — DEXTROSE MONOHYDRATE 25 G/50ML
25 INJECTION, SOLUTION INTRAVENOUS
Status: DISCONTINUED | OUTPATIENT
Start: 2022-04-28 | End: 2022-05-05 | Stop reason: HOSPADM

## 2022-04-28 RX ORDER — CITALOPRAM 20 MG/1
40 TABLET ORAL DAILY
Status: DISCONTINUED | OUTPATIENT
Start: 2022-04-28 | End: 2022-05-05 | Stop reason: HOSPADM

## 2022-04-28 RX ORDER — ONDANSETRON 2 MG/ML
4 INJECTION INTRAMUSCULAR; INTRAVENOUS EVERY 6 HOURS PRN
Status: DISCONTINUED | OUTPATIENT
Start: 2022-04-28 | End: 2022-05-05 | Stop reason: HOSPADM

## 2022-04-28 RX ORDER — PROPRANOLOL HYDROCHLORIDE 20 MG/1
20 TABLET ORAL 3 TIMES DAILY
Status: DISCONTINUED | OUTPATIENT
Start: 2022-04-28 | End: 2022-04-28

## 2022-04-28 RX ORDER — ONDANSETRON 4 MG/1
4 TABLET, FILM COATED ORAL EVERY 6 HOURS PRN
Status: DISCONTINUED | OUTPATIENT
Start: 2022-04-28 | End: 2022-05-05 | Stop reason: HOSPADM

## 2022-04-28 RX ORDER — LEVETIRACETAM 500 MG/1
500 TABLET ORAL 2 TIMES DAILY
Status: DISCONTINUED | OUTPATIENT
Start: 2022-04-28 | End: 2022-04-28

## 2022-04-28 RX ORDER — BACLOFEN 10 MG/1
20 TABLET ORAL 3 TIMES DAILY
Status: DISCONTINUED | OUTPATIENT
Start: 2022-04-28 | End: 2022-05-05 | Stop reason: HOSPADM

## 2022-04-28 RX ORDER — TRIHEXYPHENIDYL HYDROCHLORIDE 2 MG/1
2 TABLET ORAL
Status: DISCONTINUED | OUTPATIENT
Start: 2022-04-28 | End: 2022-05-05 | Stop reason: HOSPADM

## 2022-04-28 RX ORDER — LEVETIRACETAM 500 MG/1
500 TABLET ORAL 2 TIMES DAILY
COMMUNITY

## 2022-04-28 RX ADMIN — PROPRANOLOL HYDROCHLORIDE 20 MG: 20 TABLET ORAL at 20:38

## 2022-04-28 RX ADMIN — INSULIN ASPART 6 UNITS: 100 INJECTION, SOLUTION INTRAVENOUS; SUBCUTANEOUS at 11:20

## 2022-04-28 RX ADMIN — TRIHEXYPHENIDYL HYDROCHLORIDE 2 MG: 2 TABLET ORAL at 17:08

## 2022-04-28 RX ADMIN — CITALOPRAM HYDROBROMIDE 40 MG: 20 TABLET ORAL at 14:23

## 2022-04-28 RX ADMIN — LEVETIRACETAM 500 MG: 500 TABLET, FILM COATED ORAL at 20:38

## 2022-04-28 RX ADMIN — ENOXAPARIN SODIUM 40 MG: 40 INJECTION SUBCUTANEOUS at 17:08

## 2022-04-28 RX ADMIN — Medication 10 ML: at 20:38

## 2022-04-28 RX ADMIN — BACLOFEN 20 MG: 10 TABLET ORAL at 20:38

## 2022-04-29 PROBLEM — E43 SEVERE MALNUTRITION (HCC): Status: ACTIVE | Noted: 2022-04-29

## 2022-04-29 LAB
ALBUMIN SERPL-MCNC: 5 G/DL (ref 3.5–5.2)
ALBUMIN/GLOB SERPL: 1.9 G/DL
ALP SERPL-CCNC: 88 U/L (ref 39–117)
ALT SERPL W P-5'-P-CCNC: 20 U/L (ref 1–41)
ANION GAP SERPL CALCULATED.3IONS-SCNC: 14.1 MMOL/L (ref 5–15)
AST SERPL-CCNC: 29 U/L (ref 1–40)
BASOPHILS # BLD AUTO: 0.06 10*3/MM3 (ref 0–0.2)
BASOPHILS NFR BLD AUTO: 0.7 % (ref 0–1.5)
BILIRUB SERPL-MCNC: 1.7 MG/DL (ref 0–1.2)
BUN SERPL-MCNC: 19 MG/DL (ref 6–20)
BUN/CREAT SERPL: 20.2 (ref 7–25)
CALCIUM SPEC-SCNC: 10 MG/DL (ref 8.6–10.5)
CHLORIDE SERPL-SCNC: 92 MMOL/L (ref 98–107)
CO2 SERPL-SCNC: 24.9 MMOL/L (ref 22–29)
CREAT SERPL-MCNC: 0.94 MG/DL (ref 0.76–1.27)
DEPRECATED RDW RBC AUTO: 38.5 FL (ref 37–54)
EGFRCR SERPLBLD CKD-EPI 2021: 111.1 ML/MIN/1.73
EOSINOPHIL # BLD AUTO: 0.12 10*3/MM3 (ref 0–0.4)
EOSINOPHIL NFR BLD AUTO: 1.5 % (ref 0.3–6.2)
ERYTHROCYTE [DISTWIDTH] IN BLOOD BY AUTOMATED COUNT: 12.4 % (ref 12.3–15.4)
GLOBULIN UR ELPH-MCNC: 2.7 GM/DL
GLUCOSE BLDC GLUCOMTR-MCNC: 124 MG/DL (ref 70–130)
GLUCOSE BLDC GLUCOMTR-MCNC: 233 MG/DL (ref 70–130)
GLUCOSE BLDC GLUCOMTR-MCNC: 358 MG/DL (ref 70–130)
GLUCOSE BLDC GLUCOMTR-MCNC: 360 MG/DL (ref 70–130)
GLUCOSE BLDC GLUCOMTR-MCNC: 381 MG/DL (ref 70–130)
GLUCOSE BLDC GLUCOMTR-MCNC: 394 MG/DL (ref 70–130)
GLUCOSE BLDC GLUCOMTR-MCNC: 414 MG/DL (ref 70–130)
GLUCOSE BLDC GLUCOMTR-MCNC: 437 MG/DL (ref 70–130)
GLUCOSE BLDC GLUCOMTR-MCNC: 438 MG/DL (ref 70–130)
GLUCOSE SERPL-MCNC: 342 MG/DL (ref 65–99)
HCT VFR BLD AUTO: 51.7 % (ref 37.5–51)
HGB BLD-MCNC: 18.1 G/DL (ref 13–17.7)
IMM GRANULOCYTES # BLD AUTO: 0.02 10*3/MM3 (ref 0–0.05)
IMM GRANULOCYTES NFR BLD AUTO: 0.2 % (ref 0–0.5)
LYMPHOCYTES # BLD AUTO: 2.44 10*3/MM3 (ref 0.7–3.1)
LYMPHOCYTES NFR BLD AUTO: 30.2 % (ref 19.6–45.3)
MCH RBC QN AUTO: 30.2 PG (ref 26.6–33)
MCHC RBC AUTO-ENTMCNC: 35 G/DL (ref 31.5–35.7)
MCV RBC AUTO: 86.2 FL (ref 79–97)
MONOCYTES # BLD AUTO: 0.53 10*3/MM3 (ref 0.1–0.9)
MONOCYTES NFR BLD AUTO: 6.6 % (ref 5–12)
NEUTROPHILS NFR BLD AUTO: 4.9 10*3/MM3 (ref 1.7–7)
NEUTROPHILS NFR BLD AUTO: 60.8 % (ref 42.7–76)
NRBC BLD AUTO-RTO: 0 /100 WBC (ref 0–0.2)
PLATELET # BLD AUTO: 162 10*3/MM3 (ref 140–450)
PMV BLD AUTO: 10.5 FL (ref 6–12)
POTASSIUM SERPL-SCNC: 5.4 MMOL/L (ref 3.5–5.2)
PROT SERPL-MCNC: 7.7 G/DL (ref 6–8.5)
RBC # BLD AUTO: 6 10*6/MM3 (ref 4.14–5.8)
SODIUM SERPL-SCNC: 131 MMOL/L (ref 136–145)
WBC NRBC COR # BLD: 8.07 10*3/MM3 (ref 3.4–10.8)

## 2022-04-29 PROCEDURE — 97162 PT EVAL MOD COMPLEX 30 MIN: CPT

## 2022-04-29 PROCEDURE — 80053 COMPREHEN METABOLIC PANEL: CPT | Performed by: NURSE PRACTITIONER

## 2022-04-29 PROCEDURE — G0378 HOSPITAL OBSERVATION PER HR: HCPCS

## 2022-04-29 PROCEDURE — 82962 GLUCOSE BLOOD TEST: CPT

## 2022-04-29 PROCEDURE — 85025 COMPLETE CBC W/AUTO DIFF WBC: CPT | Performed by: NURSE PRACTITIONER

## 2022-04-29 PROCEDURE — 99225 PR SBSQ OBSERVATION CARE/DAY 25 MINUTES: CPT | Performed by: NURSE PRACTITIONER

## 2022-04-29 PROCEDURE — 25010000002 ONDANSETRON PER 1 MG: Performed by: NURSE PRACTITIONER

## 2022-04-29 PROCEDURE — 96372 THER/PROPH/DIAG INJ SC/IM: CPT

## 2022-04-29 PROCEDURE — 97166 OT EVAL MOD COMPLEX 45 MIN: CPT

## 2022-04-29 PROCEDURE — 63710000001 INSULIN DETEMIR PER 5 UNITS: Performed by: NURSE PRACTITIONER

## 2022-04-29 PROCEDURE — 92610 EVALUATE SWALLOWING FUNCTION: CPT

## 2022-04-29 PROCEDURE — 96374 THER/PROPH/DIAG INJ IV PUSH: CPT

## 2022-04-29 PROCEDURE — 63710000001 INSULIN ASPART PER 5 UNITS: Performed by: NURSE PRACTITIONER

## 2022-04-29 PROCEDURE — 25010000002 ENOXAPARIN PER 10 MG: Performed by: NURSE PRACTITIONER

## 2022-04-29 RX ORDER — INSULIN ASPART 100 [IU]/ML
0-7 INJECTION, SOLUTION INTRAVENOUS; SUBCUTANEOUS
Status: DISCONTINUED | OUTPATIENT
Start: 2022-04-29 | End: 2022-04-30

## 2022-04-29 RX ORDER — INSULIN ASPART 100 [IU]/ML
2 INJECTION, SOLUTION INTRAVENOUS; SUBCUTANEOUS ONCE
Status: DISCONTINUED | OUTPATIENT
Start: 2022-04-29 | End: 2022-04-29

## 2022-04-29 RX ORDER — INSULIN ASPART 100 [IU]/ML
2 INJECTION, SOLUTION INTRAVENOUS; SUBCUTANEOUS ONCE
Status: COMPLETED | OUTPATIENT
Start: 2022-04-29 | End: 2022-04-29

## 2022-04-29 RX ADMIN — HYDROXYZINE HYDROCHLORIDE 25 MG: 25 TABLET, FILM COATED ORAL at 00:09

## 2022-04-29 RX ADMIN — BACLOFEN 20 MG: 10 TABLET ORAL at 20:56

## 2022-04-29 RX ADMIN — ENOXAPARIN SODIUM 40 MG: 40 INJECTION SUBCUTANEOUS at 15:49

## 2022-04-29 RX ADMIN — Medication 10 ML: at 11:04

## 2022-04-29 RX ADMIN — PROPRANOLOL HYDROCHLORIDE 20 MG: 20 TABLET ORAL at 20:56

## 2022-04-29 RX ADMIN — TRIHEXYPHENIDYL HYDROCHLORIDE 2 MG: 2 TABLET ORAL at 11:37

## 2022-04-29 RX ADMIN — LEVETIRACETAM 500 MG: 500 TABLET, FILM COATED ORAL at 20:56

## 2022-04-29 RX ADMIN — INSULIN DETEMIR 2 UNITS: 100 INJECTION, SOLUTION SUBCUTANEOUS at 17:35

## 2022-04-29 RX ADMIN — TRIHEXYPHENIDYL HYDROCHLORIDE 2 MG: 2 TABLET ORAL at 17:34

## 2022-04-29 RX ADMIN — Medication 10 ML: at 20:56

## 2022-04-29 RX ADMIN — CITALOPRAM HYDROBROMIDE 40 MG: 20 TABLET ORAL at 08:31

## 2022-04-29 RX ADMIN — ONDANSETRON 4 MG: 2 INJECTION INTRAMUSCULAR; INTRAVENOUS at 00:09

## 2022-04-29 RX ADMIN — BACLOFEN 20 MG: 10 TABLET ORAL at 08:31

## 2022-04-29 RX ADMIN — ACETAMINOPHEN 650 MG: 325 TABLET ORAL at 00:09

## 2022-04-29 RX ADMIN — PROPRANOLOL HYDROCHLORIDE 20 MG: 20 TABLET ORAL at 15:49

## 2022-04-29 RX ADMIN — INSULIN DETEMIR 5 UNITS: 100 INJECTION, SOLUTION SUBCUTANEOUS at 08:48

## 2022-04-29 RX ADMIN — INSULIN ASPART 6 UNITS: 100 INJECTION, SOLUTION INTRAVENOUS; SUBCUTANEOUS at 17:35

## 2022-04-29 RX ADMIN — BACLOFEN 20 MG: 10 TABLET ORAL at 15:49

## 2022-04-29 RX ADMIN — LEVETIRACETAM 500 MG: 500 TABLET, FILM COATED ORAL at 08:31

## 2022-04-29 RX ADMIN — PROPRANOLOL HYDROCHLORIDE 20 MG: 20 TABLET ORAL at 08:31

## 2022-04-29 RX ADMIN — TRIHEXYPHENIDYL HYDROCHLORIDE 2 MG: 2 TABLET ORAL at 08:31

## 2022-04-29 RX ADMIN — INSULIN ASPART 2 UNITS: 100 INJECTION, SOLUTION INTRAVENOUS; SUBCUTANEOUS at 14:38

## 2022-04-29 RX ADMIN — INSULIN DETEMIR 3 UNITS: 100 INJECTION, SOLUTION SUBCUTANEOUS at 11:37

## 2022-04-30 PROBLEM — Z86.69 HISTORY OF ANOXIC BRAIN INJURY: Status: ACTIVE | Noted: 2022-04-30

## 2022-04-30 PROBLEM — Z74.09 IMPAIRED MOBILITY AND ADLS: Status: ACTIVE | Noted: 2022-04-30

## 2022-04-30 PROBLEM — Z78.9 IMPAIRED MOBILITY AND ADLS: Status: ACTIVE | Noted: 2022-04-30

## 2022-04-30 LAB
ANION GAP SERPL CALCULATED.3IONS-SCNC: 11.5 MMOL/L (ref 5–15)
BUN SERPL-MCNC: 20 MG/DL (ref 6–20)
BUN/CREAT SERPL: 21.7 (ref 7–25)
CALCIUM SPEC-SCNC: 9.6 MG/DL (ref 8.6–10.5)
CHLORIDE SERPL-SCNC: 95 MMOL/L (ref 98–107)
CO2 SERPL-SCNC: 30.5 MMOL/L (ref 22–29)
CREAT SERPL-MCNC: 0.92 MG/DL (ref 0.76–1.27)
DEPRECATED RDW RBC AUTO: 39.2 FL (ref 37–54)
EGFRCR SERPLBLD CKD-EPI 2021: 114.1 ML/MIN/1.73
ERYTHROCYTE [DISTWIDTH] IN BLOOD BY AUTOMATED COUNT: 12.4 % (ref 12.3–15.4)
GLUCOSE BLDC GLUCOMTR-MCNC: 110 MG/DL (ref 70–130)
GLUCOSE BLDC GLUCOMTR-MCNC: 153 MG/DL (ref 70–130)
GLUCOSE BLDC GLUCOMTR-MCNC: 169 MG/DL (ref 70–130)
GLUCOSE BLDC GLUCOMTR-MCNC: 192 MG/DL (ref 70–130)
GLUCOSE BLDC GLUCOMTR-MCNC: 234 MG/DL (ref 70–130)
GLUCOSE BLDC GLUCOMTR-MCNC: 375 MG/DL (ref 70–130)
GLUCOSE BLDC GLUCOMTR-MCNC: 390 MG/DL (ref 70–130)
GLUCOSE BLDC GLUCOMTR-MCNC: 531 MG/DL (ref 70–130)
GLUCOSE BLDC GLUCOMTR-MCNC: 59 MG/DL (ref 70–130)
GLUCOSE BLDC GLUCOMTR-MCNC: 86 MG/DL (ref 70–130)
GLUCOSE SERPL-MCNC: 205 MG/DL (ref 65–99)
HCT VFR BLD AUTO: 45.7 % (ref 37.5–51)
HGB BLD-MCNC: 15.9 G/DL (ref 13–17.7)
MCH RBC QN AUTO: 30.2 PG (ref 26.6–33)
MCHC RBC AUTO-ENTMCNC: 34.8 G/DL (ref 31.5–35.7)
MCV RBC AUTO: 86.7 FL (ref 79–97)
PLATELET # BLD AUTO: 234 10*3/MM3 (ref 140–450)
PMV BLD AUTO: 9.1 FL (ref 6–12)
POTASSIUM SERPL-SCNC: 4.6 MMOL/L (ref 3.5–5.2)
RBC # BLD AUTO: 5.27 10*6/MM3 (ref 4.14–5.8)
SODIUM SERPL-SCNC: 137 MMOL/L (ref 136–145)
WBC NRBC COR # BLD: 6.06 10*3/MM3 (ref 3.4–10.8)

## 2022-04-30 PROCEDURE — 85027 COMPLETE CBC AUTOMATED: CPT | Performed by: NURSE PRACTITIONER

## 2022-04-30 PROCEDURE — 96372 THER/PROPH/DIAG INJ SC/IM: CPT

## 2022-04-30 PROCEDURE — 99225 PR SBSQ OBSERVATION CARE/DAY 25 MINUTES: CPT | Performed by: NURSE PRACTITIONER

## 2022-04-30 PROCEDURE — 63710000001 INSULIN ASPART PER 5 UNITS: Performed by: NURSE PRACTITIONER

## 2022-04-30 PROCEDURE — 25010000002 ENOXAPARIN PER 10 MG: Performed by: NURSE PRACTITIONER

## 2022-04-30 PROCEDURE — 63710000001 INSULIN DETEMIR PER 5 UNITS: Performed by: NURSE PRACTITIONER

## 2022-04-30 PROCEDURE — 80048 BASIC METABOLIC PNL TOTAL CA: CPT | Performed by: NURSE PRACTITIONER

## 2022-04-30 PROCEDURE — 63710000001 INSULIN ASPART PER 5 UNITS: Performed by: FAMILY MEDICINE

## 2022-04-30 PROCEDURE — G0378 HOSPITAL OBSERVATION PER HR: HCPCS

## 2022-04-30 PROCEDURE — 82962 GLUCOSE BLOOD TEST: CPT

## 2022-04-30 PROCEDURE — 97116 GAIT TRAINING THERAPY: CPT

## 2022-04-30 PROCEDURE — 97530 THERAPEUTIC ACTIVITIES: CPT

## 2022-04-30 RX ORDER — DEXTROSE MONOHYDRATE 25 G/50ML
25 INJECTION, SOLUTION INTRAVENOUS
Status: DISCONTINUED | OUTPATIENT
Start: 2022-04-30 | End: 2022-05-05 | Stop reason: HOSPADM

## 2022-04-30 RX ORDER — INSULIN ASPART 100 [IU]/ML
0-9 INJECTION, SOLUTION INTRAVENOUS; SUBCUTANEOUS
Status: DISCONTINUED | OUTPATIENT
Start: 2022-05-01 | End: 2022-05-01

## 2022-04-30 RX ORDER — INSULIN ASPART 100 [IU]/ML
9 INJECTION, SOLUTION INTRAVENOUS; SUBCUTANEOUS ONCE
Status: COMPLETED | OUTPATIENT
Start: 2022-04-30 | End: 2022-04-30

## 2022-04-30 RX ORDER — NICOTINE POLACRILEX 4 MG
1 LOZENGE BUCCAL
Status: DISCONTINUED | OUTPATIENT
Start: 2022-04-30 | End: 2022-05-05 | Stop reason: HOSPADM

## 2022-04-30 RX ADMIN — INSULIN ASPART 9 UNITS: 100 INJECTION, SOLUTION INTRAVENOUS; SUBCUTANEOUS at 21:20

## 2022-04-30 RX ADMIN — PROPRANOLOL HYDROCHLORIDE 20 MG: 20 TABLET ORAL at 21:19

## 2022-04-30 RX ADMIN — TRIHEXYPHENIDYL HYDROCHLORIDE 2 MG: 2 TABLET ORAL at 08:14

## 2022-04-30 RX ADMIN — LEVETIRACETAM 500 MG: 500 TABLET, FILM COATED ORAL at 21:19

## 2022-04-30 RX ADMIN — BACLOFEN 20 MG: 10 TABLET ORAL at 21:19

## 2022-04-30 RX ADMIN — LEVETIRACETAM 500 MG: 500 TABLET, FILM COATED ORAL at 08:14

## 2022-04-30 RX ADMIN — Medication 10 ML: at 10:22

## 2022-04-30 RX ADMIN — INSULIN ASPART 2 UNITS: 100 INJECTION, SOLUTION INTRAVENOUS; SUBCUTANEOUS at 12:54

## 2022-04-30 RX ADMIN — TRIHEXYPHENIDYL HYDROCHLORIDE 2 MG: 2 TABLET ORAL at 12:54

## 2022-04-30 RX ADMIN — PROPRANOLOL HYDROCHLORIDE 20 MG: 20 TABLET ORAL at 16:29

## 2022-04-30 RX ADMIN — Medication 10 ML: at 21:19

## 2022-04-30 RX ADMIN — INSULIN ASPART 3 UNITS: 100 INJECTION, SOLUTION INTRAVENOUS; SUBCUTANEOUS at 07:00

## 2022-04-30 RX ADMIN — INSULIN DETEMIR 10 UNITS: 100 INJECTION, SOLUTION SUBCUTANEOUS at 08:08

## 2022-04-30 RX ADMIN — BACLOFEN 20 MG: 10 TABLET ORAL at 08:14

## 2022-04-30 RX ADMIN — PROPRANOLOL HYDROCHLORIDE 20 MG: 20 TABLET ORAL at 08:14

## 2022-04-30 RX ADMIN — ENOXAPARIN SODIUM 40 MG: 40 INJECTION SUBCUTANEOUS at 16:29

## 2022-04-30 RX ADMIN — TRIHEXYPHENIDYL HYDROCHLORIDE 2 MG: 2 TABLET ORAL at 18:18

## 2022-04-30 RX ADMIN — CITALOPRAM HYDROBROMIDE 40 MG: 20 TABLET ORAL at 08:14

## 2022-04-30 RX ADMIN — BACLOFEN 20 MG: 10 TABLET ORAL at 16:29

## 2022-05-01 LAB
ANION GAP SERPL CALCULATED.3IONS-SCNC: 8.1 MMOL/L (ref 5–15)
BUN SERPL-MCNC: 20 MG/DL (ref 6–20)
BUN/CREAT SERPL: 25.6 (ref 7–25)
CALCIUM SPEC-SCNC: 9.5 MG/DL (ref 8.6–10.5)
CHLORIDE SERPL-SCNC: 94 MMOL/L (ref 98–107)
CO2 SERPL-SCNC: 33.9 MMOL/L (ref 22–29)
CREAT SERPL-MCNC: 0.78 MG/DL (ref 0.76–1.27)
EGFRCR SERPLBLD CKD-EPI 2021: 122.3 ML/MIN/1.73
GLUCOSE BLDC GLUCOMTR-MCNC: 132 MG/DL (ref 70–130)
GLUCOSE BLDC GLUCOMTR-MCNC: 136 MG/DL (ref 70–130)
GLUCOSE BLDC GLUCOMTR-MCNC: 312 MG/DL (ref 70–130)
GLUCOSE BLDC GLUCOMTR-MCNC: 420 MG/DL (ref 70–130)
GLUCOSE BLDC GLUCOMTR-MCNC: 433 MG/DL (ref 70–130)
GLUCOSE BLDC GLUCOMTR-MCNC: 444 MG/DL (ref 70–130)
GLUCOSE BLDC GLUCOMTR-MCNC: 449 MG/DL (ref 70–130)
GLUCOSE BLDC GLUCOMTR-MCNC: 486 MG/DL (ref 70–130)
GLUCOSE SERPL-MCNC: 327 MG/DL (ref 65–99)
POTASSIUM SERPL-SCNC: 4.4 MMOL/L (ref 3.5–5.2)
SODIUM SERPL-SCNC: 136 MMOL/L (ref 136–145)

## 2022-05-01 PROCEDURE — 25010000002 ENOXAPARIN PER 10 MG: Performed by: NURSE PRACTITIONER

## 2022-05-01 PROCEDURE — 80048 BASIC METABOLIC PNL TOTAL CA: CPT | Performed by: NURSE PRACTITIONER

## 2022-05-01 PROCEDURE — 63710000001 INSULIN DETEMIR PER 5 UNITS: Performed by: NURSE PRACTITIONER

## 2022-05-01 PROCEDURE — 99225 PR SBSQ OBSERVATION CARE/DAY 25 MINUTES: CPT | Performed by: NURSE PRACTITIONER

## 2022-05-01 PROCEDURE — G0378 HOSPITAL OBSERVATION PER HR: HCPCS

## 2022-05-01 PROCEDURE — 82962 GLUCOSE BLOOD TEST: CPT

## 2022-05-01 PROCEDURE — 63710000001 INSULIN REGULAR HUMAN PER 5 UNITS: Performed by: NURSE PRACTITIONER

## 2022-05-01 PROCEDURE — 96372 THER/PROPH/DIAG INJ SC/IM: CPT

## 2022-05-01 RX ADMIN — CITALOPRAM HYDROBROMIDE 40 MG: 20 TABLET ORAL at 08:37

## 2022-05-01 RX ADMIN — PROPRANOLOL HYDROCHLORIDE 20 MG: 20 TABLET ORAL at 21:53

## 2022-05-01 RX ADMIN — LEVETIRACETAM 500 MG: 500 TABLET, FILM COATED ORAL at 08:37

## 2022-05-01 RX ADMIN — BACLOFEN 20 MG: 10 TABLET ORAL at 21:54

## 2022-05-01 RX ADMIN — PROPRANOLOL HYDROCHLORIDE 20 MG: 20 TABLET ORAL at 16:05

## 2022-05-01 RX ADMIN — INSULIN DETEMIR 11 UNITS: 100 INJECTION, SOLUTION SUBCUTANEOUS at 09:00

## 2022-05-01 RX ADMIN — TRIHEXYPHENIDYL HYDROCHLORIDE 2 MG: 2 TABLET ORAL at 08:37

## 2022-05-01 RX ADMIN — PROPRANOLOL HYDROCHLORIDE 20 MG: 20 TABLET ORAL at 10:00

## 2022-05-01 RX ADMIN — TRIHEXYPHENIDYL HYDROCHLORIDE 2 MG: 2 TABLET ORAL at 17:30

## 2022-05-01 RX ADMIN — LEVETIRACETAM 500 MG: 500 TABLET, FILM COATED ORAL at 21:54

## 2022-05-01 RX ADMIN — BACLOFEN 20 MG: 10 TABLET ORAL at 16:05

## 2022-05-01 RX ADMIN — TRIHEXYPHENIDYL HYDROCHLORIDE 2 MG: 2 TABLET ORAL at 12:22

## 2022-05-01 RX ADMIN — BACLOFEN 20 MG: 10 TABLET ORAL at 08:37

## 2022-05-01 RX ADMIN — HUMAN INSULIN 5 UNITS: 100 INJECTION, SOLUTION SUBCUTANEOUS at 08:38

## 2022-05-01 RX ADMIN — HUMAN INSULIN 7 UNITS: 100 INJECTION, SOLUTION SUBCUTANEOUS at 12:22

## 2022-05-01 RX ADMIN — Medication 10 ML: at 08:40

## 2022-05-01 RX ADMIN — HUMAN INSULIN 7 UNITS: 100 INJECTION, SOLUTION SUBCUTANEOUS at 23:25

## 2022-05-01 RX ADMIN — Medication 10 ML: at 21:54

## 2022-05-01 RX ADMIN — ENOXAPARIN SODIUM 40 MG: 40 INJECTION SUBCUTANEOUS at 16:05

## 2022-05-01 NOTE — PLAN OF CARE
Goal Outcome Evaluation:  Plan of Care Reviewed With: patient        Progress: no change  Outcome Evaluation: Vital signs stable. Patient worked with PT today. Blood sugars monitored close. No acute changes noted at this time.

## 2022-05-01 NOTE — PLAN OF CARE
Goal Outcome Evaluation:           Progress: no change  Outcome Evaluation: Vital signs stable. Blood sugars monitored close. No acute changes noted during the shift.

## 2022-05-01 NOTE — PROGRESS NOTES
"    [facility]    Name:  Stephen Delgado   Age:  31 y.o.  Sex:  male  :  1990  MRN:  [id number]   Visit Number:  [id number]  Admission Date:  2022  Date Of Service:  22  Primary Care Physician:  Caryl Mayer APRN      Chief Complaint:      LTC placement    Subjective:    Patient seen and evaluated this morning.  Patient does not have any family present at bedside on my exam.  Patient with garbled speech at baseline. Prior provider documentation/labs/vitals reviewed.  Patient does nod head no and will raise his head and shoulders off the pillow for yes if you give him time to respond. Patient stated he \"was good, thank you\". Updated him again that [facility] would be accepting him possibly on Thursday for which he nodded yes to. Was noted to have one episode of hypoglycemia yesterday at 59.    Hospital Course:    Patient is a 31 year old male who presented to the emergency department via EMS with complaints of chest pain reported by family.  Patient with past health history of Type 1 Diabetes, anoxic brain injury, and anxiety with garbled speech at baseline.  His legal guardian is his brother who advised that patient complained of chest pain and overall not feeling well with erratic blood sugars.  Brother advised when arriving to the hospital that he was unable to care for him any longer and wanted placement.  Patient with previous ED visit approximately one week ago with request for long term care placement who was advised to try for outpatient evaluation as patient did not meet inpatient criteria.  Family reportedly went to PCP and was told to return to the Emergency Department for nursing home placement.    Prior anoxic brain injury was due to DKA and coding, found down by brother and achieved ROSC, then coded again.  He ambulates at baseline and can alert others if his sugars are low, is able to urinate alone but needs " assistance with bowel movements due to hand contractures.  Patient was doing well until Covid. He has been unable to receive any therapy for approximately 1.5 years.  Prior to Covid patient could hold a spoon and feed himself.  Brother has been sole caregiver for the past 5 years.  Case management consulted from the Emergency Department to start referral process after patient's brother was contacted to come and  the patient and ultimately refused to return to the hospital.  Hospitalist was contacted for admission. Pt/OT/Case Management following patient.    Review of Systems:     All systems were reviewed and negative except as mentioned in subjective, assessment and plan.    Vital Signs:    Temp:  [97.2 °F (36.2 °C)-98.6 °F (37 °C)] 98.6 °F (37 °C)  Heart Rate:  [75-91] 91  Resp:  [16-24] 16  BP: ()/(64-98) 110/68    Intake and output:    I/O last 3 completed shifts:  In: 3000 [P.O.:3000]  Out: 2500 [Urine:2500]  I/O this shift:  In: 360 [P.O.:360]  Out: -     Physical Examination:    General Appearance:  Alert and cooperative, pleasant young male, no acute distress on exam   Head:  Atraumatic and normocephalic.   Eyes: Conjunctivae and sclerae normal, no icterus. No pallor.   Throat: No oral lesions, no thrush, oral mucosa moist.   Neck: Supple, trachea midline   Lungs:   Breath sounds heard bilaterally equally.  No wheezing or crackles. Unlabored on room air   Heart:  Normal S1 and S2, RRR, no murmur, no gallop, no rub. No JVD.   Abdomen:   Normal bowel sounds, no masses, no organomegaly. Soft, nontender, nondistended, no rebound tenderness.   Extremities: Supple, no edema, no cyanosis, no clubbing, contractures to bilateral hands- extremity wasting noted.   Skin: No bleeding or rash.   Neurologic: Alert- nonverbal at baseline with garbled speech from anoxic brain injury. Some speech comprehensible. No facial asymmetry. Moves all four limbs.      Laboratory results:    Results from last 7 days   Lab  Units 05/01/22  0647 04/30/22  0555 04/29/22  0542 04/27/22  2335   SODIUM mmol/L 136 137 131* 135*   POTASSIUM mmol/L 4.4 4.6 5.4* 4.7   CHLORIDE mmol/L 94* 95* 92* 95*   CO2 mmol/L 33.9* 30.5* 24.9 30.5*   BUN mg/dL 20 20 19 13   CREATININE mg/dL 0.78 0.92 0.94 1.00   CALCIUM mg/dL 9.5 9.6 10.0 9.7   BILIRUBIN mg/dL  --   --  1.7* 1.0   ALK PHOS U/L  --   --  88 100   ALT (SGPT) U/L  --   --  20 15   AST (SGOT) U/L  --   --  29 19   GLUCOSE mg/dL 327* 205* 342* 308*     Results from last 7 days   Lab Units 04/30/22  0555 04/29/22  0542 04/27/22  2335   WBC 10*3/mm3 6.06 8.07 5.99   HEMOGLOBIN g/dL 15.9 18.1* 15.7   HEMATOCRIT % 45.7 51.7* 44.0   PLATELETS 10*3/mm3 234 162 264         Results from last 7 days   Lab Units 04/28/22  0203 04/27/22  2335   TROPONIN T ng/mL <0.010 <0.010             I have reviewed the patient's laboratory results.    Radiology results:    No radiology results from the last 24 hrs  I have reviewed the patient's radiology reports.    Medication Review:     I have reviewed the patient's active and prn medications.     Problem List:      Type 1 diabetes mellitus with unspecified complications (HCC)    Gastro-esophageal reflux disease without esophagitis    Chest pain in adult    Severe malnutrition (HCC)    History of anoxic brain injury    Impaired mobility and ADLs      Assessment:    Type 1 Diabetes, uncontrolled  History of anoxic brain injury with seizures  Anxiety  Inability to complete ADLs independently  Neurogenic Hypertension  Muscle Spasticity  Malnutrition    Plan:    Type 1 Diabetes Mellitus  -Levemir 11 units daily after breakfast- will titrate up as tolerated  - FSBS extremely erratic  - Monitor for hyper-hypoglycemia closely- will hold sliding scale if patient consumes < 25% meal given hypoglycemia noted.     Seizures  -Continue Keppra per home dose     Anxiety  -Continue with Hydroxyzine PRN     Inability to complete ADLs independently  -Consult PT/OT/Speech on  admission  -Reported to be ambulatory at baseline but limited due to contractures     Neurogenic Hypertension  -Continue with Propranolol per home dose     Muscle Spasticity  -Continue with Baclofen and Artane home dosing    Malnutrition  -Nutritionist consulted.    1200-Brother Joey Delgado called and updated on POC. Informed last  note stated likely discharge on Thursday via EMS. Brother agreeable with all questions answered.    DVT Prophylaxis: Lovenox  Code Status: Full Code  Diet: Consistent Carb  Discharge Plan: Pending LTC placement-Thursday    Zoya Haro, APRN  05/01/22  12:09 EDT    Dictated utilizing Dragon dictation.

## 2022-05-01 NOTE — THERAPY TREATMENT NOTE
Pt with head covered upon PTA arrival did remove to answer no to amb at this time. Pt reported tired right now. Will f/u with pt later

## 2022-05-01 NOTE — PLAN OF CARE
Goal Outcome Evaluation:  Plan of Care Reviewed With: patient        Progress: no change  Outcome Evaluation: VSS,  MONITORING BLOOD SUGARS,  NO ACUTE EVENTS OVERNIGHT.

## 2022-05-02 LAB
ANION GAP SERPL CALCULATED.3IONS-SCNC: 7.2 MMOL/L (ref 5–15)
BUN SERPL-MCNC: 21 MG/DL (ref 6–20)
BUN/CREAT SERPL: 28.8 (ref 7–25)
CALCIUM SPEC-SCNC: 9.1 MG/DL (ref 8.6–10.5)
CHLORIDE SERPL-SCNC: 99 MMOL/L (ref 98–107)
CO2 SERPL-SCNC: 30.8 MMOL/L (ref 22–29)
CREAT SERPL-MCNC: 0.73 MG/DL (ref 0.76–1.27)
EGFRCR SERPLBLD CKD-EPI 2021: 124.7 ML/MIN/1.73
GLUCOSE BLDC GLUCOMTR-MCNC: 111 MG/DL (ref 70–130)
GLUCOSE BLDC GLUCOMTR-MCNC: 150 MG/DL (ref 70–130)
GLUCOSE BLDC GLUCOMTR-MCNC: 158 MG/DL (ref 70–130)
GLUCOSE BLDC GLUCOMTR-MCNC: 197 MG/DL (ref 70–130)
GLUCOSE BLDC GLUCOMTR-MCNC: 320 MG/DL (ref 70–130)
GLUCOSE BLDC GLUCOMTR-MCNC: 75 MG/DL (ref 70–130)
GLUCOSE BLDC GLUCOMTR-MCNC: 87 MG/DL (ref 70–130)
GLUCOSE SERPL-MCNC: 331 MG/DL (ref 65–99)
POTASSIUM SERPL-SCNC: 4.3 MMOL/L (ref 3.5–5.2)
SODIUM SERPL-SCNC: 137 MMOL/L (ref 136–145)

## 2022-05-02 PROCEDURE — 97535 SELF CARE MNGMENT TRAINING: CPT

## 2022-05-02 PROCEDURE — 63710000001 INSULIN DETEMIR PER 5 UNITS: Performed by: NURSE PRACTITIONER

## 2022-05-02 PROCEDURE — 99225 PR SBSQ OBSERVATION CARE/DAY 25 MINUTES: CPT | Performed by: NURSE PRACTITIONER

## 2022-05-02 PROCEDURE — 82962 GLUCOSE BLOOD TEST: CPT

## 2022-05-02 PROCEDURE — G0378 HOSPITAL OBSERVATION PER HR: HCPCS

## 2022-05-02 PROCEDURE — 80048 BASIC METABOLIC PNL TOTAL CA: CPT | Performed by: NURSE PRACTITIONER

## 2022-05-02 PROCEDURE — 97116 GAIT TRAINING THERAPY: CPT

## 2022-05-02 PROCEDURE — 97530 THERAPEUTIC ACTIVITIES: CPT

## 2022-05-02 PROCEDURE — 25010000002 ENOXAPARIN PER 10 MG: Performed by: NURSE PRACTITIONER

## 2022-05-02 PROCEDURE — 63710000001 INSULIN REGULAR HUMAN PER 5 UNITS: Performed by: NURSE PRACTITIONER

## 2022-05-02 PROCEDURE — 96372 THER/PROPH/DIAG INJ SC/IM: CPT

## 2022-05-02 RX ADMIN — BACLOFEN 20 MG: 10 TABLET ORAL at 20:47

## 2022-05-02 RX ADMIN — HYDROXYZINE HYDROCHLORIDE 25 MG: 25 TABLET, FILM COATED ORAL at 20:47

## 2022-05-02 RX ADMIN — ENOXAPARIN SODIUM 40 MG: 40 INJECTION SUBCUTANEOUS at 17:31

## 2022-05-02 RX ADMIN — PROPRANOLOL HYDROCHLORIDE 20 MG: 20 TABLET ORAL at 09:31

## 2022-05-02 RX ADMIN — Medication 10 ML: at 09:33

## 2022-05-02 RX ADMIN — PROPRANOLOL HYDROCHLORIDE 20 MG: 20 TABLET ORAL at 20:47

## 2022-05-02 RX ADMIN — Medication 10 ML: at 20:49

## 2022-05-02 RX ADMIN — HUMAN INSULIN 2 UNITS: 100 INJECTION, SOLUTION SUBCUTANEOUS at 12:24

## 2022-05-02 RX ADMIN — LEVETIRACETAM 500 MG: 500 TABLET, FILM COATED ORAL at 09:31

## 2022-05-02 RX ADMIN — TRIHEXYPHENIDYL HYDROCHLORIDE 2 MG: 2 TABLET ORAL at 09:32

## 2022-05-02 RX ADMIN — HUMAN INSULIN 5 UNITS: 100 INJECTION, SOLUTION SUBCUTANEOUS at 06:41

## 2022-05-02 RX ADMIN — PROPRANOLOL HYDROCHLORIDE 20 MG: 20 TABLET ORAL at 17:31

## 2022-05-02 RX ADMIN — INSULIN DETEMIR 11 UNITS: 100 INJECTION, SOLUTION SUBCUTANEOUS at 09:32

## 2022-05-02 RX ADMIN — BACLOFEN 20 MG: 10 TABLET ORAL at 09:31

## 2022-05-02 RX ADMIN — LEVETIRACETAM 500 MG: 500 TABLET, FILM COATED ORAL at 20:47

## 2022-05-02 RX ADMIN — TRIHEXYPHENIDYL HYDROCHLORIDE 2 MG: 2 TABLET ORAL at 12:24

## 2022-05-02 RX ADMIN — BACLOFEN 20 MG: 10 TABLET ORAL at 17:30

## 2022-05-02 RX ADMIN — CITALOPRAM HYDROBROMIDE 40 MG: 20 TABLET ORAL at 09:31

## 2022-05-02 RX ADMIN — TRIHEXYPHENIDYL HYDROCHLORIDE 2 MG: 2 TABLET ORAL at 17:31

## 2022-05-02 NOTE — CASE MANAGEMENT/SOCIAL WORK
Continued Stay Note   Amar[facility]     Patient Name: Stephen Delgado  MRN: [id number]  Today's Date: 5/2/2022    Admit Date: 4/27/2022     Discharge Plan     Row Name 05/02/22 0957       Plan    Plan per Fartun/Director, pt transportation is planned for Thursday, possibly sooner if available, with  [facility] [place], [place] ; called and informed Maryse/Admissions (cell [phone]), stated just let her know when coming; will need a covid within 48 hours of admission; call report to [phone] ask for Director of Nursing/Sandy, fax d/c summary to [phone]; sent message to np and rn  11:05 EDT  Joey/brother called for update; discussed plan above and asked how would pay bill to ems, informed would be billed for cost and would receive in mail; requested a call day of transfer and he plans to bring clothes in sooner for pt to have at facility               Discharge Codes    No documentation.               Expected Discharge Date and Time     Expected Discharge Date Expected Discharge Time    May 5, 2022             Liz Tyson RN

## 2022-05-02 NOTE — THERAPY TREATMENT NOTE
Patient Name: Stephen Delgado  : 1990    MRN: [id number]                              Today's Date: 2022       Admit Date: 2022    Visit Dx:     ICD-10-CM ICD-9-CM   1. Chest pain in adult  R07.9 786.50   2. History of anoxic brain injury  Z86.69 V12.49   3. Type 1 diabetes mellitus with hyperglycemia (HCC)  E10.65 250.01     Patient Active Problem List   Diagnosis   • Anoxic brain damage (HCC)   • IDDM (insulin dependent diabetes mellitus)   • Contracture of left wrist   • Underweight   • G tube feedings (HCC)   • Neurogenic dysphagia   • Type 1 diabetes mellitus with unspecified complications (HCC)   • Requires daily assistance for activities of daily living (ADL) and comfort needs   • Gastro-esophageal reflux disease without esophagitis   • History of alcoholism ([facility])   • Chest pain in adult   • Severe malnutrition (HCC)   • History of anoxic brain injury   • Impaired mobility and ADLs     Past Medical History:   Diagnosis Date   • Alcohol dependence in remission ([facility])    • Anoxic brain damage (HCC)    • Anoxic brain injury (HCC)    • Cardiac arrest due to other underlying condition ([facility]) 03/15/2017   • DKA (diabetic ketoacidosis) ([facility])    • G tube feedings ([facility])    • Hirschsprung's disease    • Opioid abuse ([facility])    • Shock liver 03/15/2017   • Type 1 diabetes mellitus on insulin therapy ([facility])      Past Surgical History:   Procedure Laterality Date   • COLOSTOMY     • KNEE ACL RECONSTRUCTION Right    • TRACHEOSTOMY  2017      General Information     Row Name 22 1614          OT Time and Intention    Document Type therapy note (daily note)  -     Mode of Treatment occupational therapy  -     Row Name 22 1614          General Information    Patient Profile Reviewed yes  -     Existing Precautions/Restrictions fall  -           User Key  (r) = Recorded By, (t) = Taken By, (c) = Cosigned By    Initials Name Provider Type     Sienna Graham Occupational Therapist                  Mobility/ADL's     [facility] Name 05/02/22 1614          Bed Mobility    Supine-Sit [place] (Bed Mobility) modified independence  -     Sit-Supine [place] (Bed Mobility) modified independence  -     Assistive Device (Bed Mobility) head of bed elevated  -[facility] Name 05/02/22 1614          Transfers    Sit-Stand [place] (Transfers) contact guard  -[facility] Name 05/02/22 1614          Sit-Stand Transfer    Assistive Device (Sit-Stand Transfers) other (see comments)  gait belt  -[facility] Name 05/02/22 1614          Grooming Assessment/Training    [place] Level (Grooming) wash face, hands;moderate assist (50% patient effort);oral care regimen;maximum assist (25% patient effort)  -     Comment, (Grooming) washed his face sitting eob, mod assist needed as pt unable to provide adequate pressure to clean himself.  pt stood at sink for oral care, max assist to perform tasks  -           User Key  (r) = Recorded By, (t) = Taken By, (c) = Cosigned By    Initials Name Provider Type     Sienna Graham Occupational Therapist               Obj/Interventions    No documentation.                Goals/Plan    No documentation.                Clinical Impression     [facility] Name 05/02/22 1616          Pain Assessment    Pretreatment Pain Rating 0/10 - no pain  -     Posttreatment Pain Rating 0/10 - no pain  -[facility] Name 05/02/22 1616          Plan of Care Review    Plan of Care Reviewed With patient  -     Progress improving  -     Outcome Evaluation Pt received supine in bed, modified independence for bed mobility.  Sat eob and attempted to wash his face, pt needs mod assist as his tone will not allow him to provide adequate pressure to wash thoroughly.  Pt stood at sink for oral care tasks, max assist to brush his teeth again d/t increased tone in upper body.  Cont OT per POC  -[facility] Name 05/02/22 1616          Positioning and Restraints    Pre-Treatment Position in bed  -     Post  Treatment Position bed  -     In Bed supine;call light within reach;encouraged to call for assist;exit alarm on  -           User Key  (r) = Recorded By, (t) = Taken By, (c) = Cosigned By    Initials Name Provider Type    Sienna Sanderson Occupational Therapist               Outcome Measures     Row Name 05/02/22 1619          How much help from another is currently needed...    Putting on and taking off regular lower body clothing? 2  -AH     Bathing (including washing, rinsing, and drying) 2  -AH     Toileting (which includes using toilet bed pan or urinal) 2  -AH     Putting on and taking off regular upper body clothing 2  -AH     Taking care of personal grooming (such as brushing teeth) 2  -AH     Eating meals 2  -     AM-PAC 6 Clicks Score (OT) 12  -AH     Row Name 05/02/22 1559          How much help from another person do you currently need...    Turning from your back to your side while in flat bed without using bedrails? 4  -RM     Moving from lying on back to sitting on the side of a flat bed without bedrails? 4  -RM     Moving to and from a bed to a chair (including a wheelchair)? 3  -RM     Standing up from a chair using your arms (e.g., wheelchair, bedside chair)? 4  -RM     Climbing 3-5 steps with a railing? 3  -RM     To walk in hospital room? 3  -RM     AM-PAC 6 Clicks Score (PT) 21  -RM     Highest level of mobility 6 --> Walked 10 steps or more  -RM     Row Name 05/02/22 1619 05/02/22 1559       Functional Assessment    Outcome Measure Options AM-PAC 6 Clicks Daily Activity (OT)  - AM-PAC 6 Clicks Basic Mobility (PT)  -RM          User Key  (r) = Recorded By, (t) = Taken By, (c) = Cosigned By    Initials Name Provider Type    Sienna Sanderson Occupational Therapist    Jeff Tavares, PTA Physical Therapist Assistant                Occupational Therapy Education                 Title: PT OT SLP Therapies (In Progress)     Topic: Occupational Therapy (In Progress)     Point: ADL  training (Done)     Description:   Instruct learner(s) on proper safety adaptation and remediation techniques during self care or transfers.   Instruct in proper use of assistive devices.              Learning Progress Summary           Patient Acceptance, E,TB, VU by  at 5/2/2022 1614    Comment: benefit of working with therapy    Acceptance, E,TB, VU by  at 4/29/2022 3581    Comment: Role of OT/POC                   Point: Home exercise program (Not Started)     Description:   Instruct learner(s) on appropriate technique for monitoring, assisting and/or progressing therapeutic exercises/activities.              Learner Progress:  Not documented in this visit.          Point: Precautions (Not Started)     Description:   Instruct learner(s) on prescribed precautions during self-care and functional transfers.              Learner Progress:  Not documented in this visit.          Point: Body mechanics (Not Started)     Description:   Instruct learner(s) on proper positioning and spine alignment during self-care, functional mobility activities and/or exercises.              Learner Progress:  Not documented in this visit.                      User Key     Initials Effective Dates Name Provider Type Discipline     06/16/21 -  Sienna Graham Occupational Therapist OT              OT Recommendation and Plan  Planned Therapy Interventions (OT): BADL retraining, patient/caregiver education/training, transfer/mobility retraining  Therapy Frequency (OT): 3 times/wk  Plan of Care Review  Plan of Care Reviewed With: patient  Progress: improving  Outcome Evaluation: Pt received supine in bed, modified independence for bed mobility.  Sat eob and attempted to wash his face, pt needs mod assist as his tone will not allow him to provide adequate pressure to wash thoroughly.  Pt stood at sink for oral care tasks, max assist to brush his teeth again d/t increased tone in upper body.  Cont OT per POC     Time Calculation:    Time  Calculation- OT     Row Name 05/02/22 1620 05/02/22 1600          Time Calculation- OT    OT Start Time 1500  - --     OT Stop Time 1525  - --     OT Time Calculation (min) 25 min  - --     OT Received On 05/02/22  - --     OT Goal Re-Cert Due Date 05/09/22  - --            Timed Charges    46393 - Gait Training Minutes  -- 20  -RM     46856 - OT Therapeutic Activity Minutes 10  -AH --     65320 - OT Self Care/Mgmt Minutes 15  -AH --            Total Minutes    Timed Charges Total Minutes 25  -AH 20  -RM      Total Minutes 25  -AH 20  -RM           User Key  (r) = Recorded By, (t) = Taken By, (c) = Cosigned By    Initials Name Provider Type    Sienna Sanderson Occupational Therapist    Jeff Tavares, PTA Physical Therapist Assistant              Therapy Charges for Today     Code Description Service Date Service Provider Modifiers Qty    32444893367 HC OT THERAPEUTIC ACT EA 15 MIN 5/2/2022 Sienna Graham 1    57624471050 HC OT SELF CARE/MGMT/TRAIN EA 15 MIN 5/2/2022 Sienna Graham 1               Sienna Graham  5/2/2022
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
## 2022-05-02 NOTE — PLAN OF CARE
Goal Outcome Evaluation:  Plan of Care Reviewed With: patient        Progress: improving  Outcome Evaluation: Pt found supine in bed and willing to participate. Pt performed bed mobility with cga and sts with cga/min a with gaitnelt. Pt was able to ambulate 100'x4 with standing rests and vc's for safety.  See flowsheet for details

## 2022-05-02 NOTE — PROGRESS NOTES
"    [facility] HOSPITALIST    PROGRESS NOTE    Name:  Stephen Delgado   Age:  31 y.o.  Sex:  male  :  1990  MRN:  [id number]   Visit Number:  [id number]  Admission Date:  2022  Date Of Service:  22  Primary Care Physician:  Caryl Mayer APRN      Chief Complaint:      LTC placement    Subjective:    Patient seen and evaluated this morning.  Patient does not have any family present at bedside on my exam.  Patient with garbled speech at baseline. Prior provider documentation/labs/vitals reviewed.  Patient does nod head no and will raise his head and shoulders off the pillow for yes if you give him time to respond. Patient smiling and repeats x 2 \"thank you for your help\". Updated him again that [facility] would be accepting him possibly on Thursday for which he nodded yes to.     Hospital Course:    Patient is a 31 year old male who presented to the emergency department via EMS with complaints of chest pain reported by family.  Patient with past health history of Type 1 Diabetes, anoxic brain injury, and anxiety with garbled speech at baseline.  His legal guardian is his brother who advised that patient complained of chest pain and overall not feeling well with erratic blood sugars.  Brother advised when arriving to the hospital that he was unable to care for him any longer and wanted placement.    Prior anoxic brain injury was due to DKA and code blue, found down by brother and achieved ROSC, then coded again.  He ambulates at baseline and can alert others if his sugars are low, is able to urinate alone but needs assistance with bowel movements due to hand contractures.  Patient was doing well until Covid. He has been unable to receive any therapy for approximately 1.5 years.  Prior to Covid patient could hold a spoon and feed himself.  Brother has been sole caregiver for the past 5 years.   Hospitalist was contacted for admission. PT/OT/Case Management " following patient. Patient accepted at nursing facility in [place], [place] for which transportation available Thursday or sooner.    Review of Systems:     All systems were reviewed and negative except as mentioned in subjective, assessment and plan.    Vital Signs:    Temp:  [97.6 °F (36.4 °C)-98.5 °F (36.9 °C)] 97.8 °F (36.6 °C)  Heart Rate:  [66-90] 90  Resp:  [16-18] 18  BP: ()/(60-94) 117/94    Intake and output:    I/O last 3 completed shifts:  In: 3180 [P.O.:3180]  Out: 1625 [Urine:1625]  No intake/output data recorded.    Physical Examination:    General Appearance:  Alert and cooperative, pleasant young male, no acute distress on exam   Head:  Atraumatic and normocephalic.   Eyes: Conjunctivae and sclerae normal, no icterus. No pallor.   Throat: No oral lesions, no thrush, oral mucosa moist.   Neck: Supple, trachea midline   Lungs:   Breath sounds heard bilaterally equally.  No wheezing or crackles. Unlabored on room air   Heart:  Normal S1 and S2, RRR, no murmur, no gallop, no rub. No JVD.   Abdomen:   Normal bowel sounds, no masses, no organomegaly. Soft, nontender, nondistended, no rebound tenderness.   Extremities: Supple, no edema, no cyanosis, no clubbing, contractures to bilateral hands- extremity wasting noted.   Skin: No bleeding or rash.   Neurologic: Alert- nonverbal at baseline with garbled speech from anoxic brain injury. Some speech comprehensible. No facial asymmetry. Moves all four limbs.      Laboratory results:    Results from last 7 days   Lab Units 05/02/22  0616 05/01/22  0647 04/30/22  0555 04/29/22  0542 04/27/22  2335   SODIUM mmol/L 137 136 137 131* 135*   POTASSIUM mmol/L 4.3 4.4 4.6 5.4* 4.7   CHLORIDE mmol/L 99 94* 95* 92* 95*   CO2 mmol/L 30.8* 33.9* 30.5* 24.9 30.5*   BUN mg/dL 21* 20 20 19 13   CREATININE mg/dL 0.73* 0.78 0.92 0.94 1.00   CALCIUM mg/dL 9.1 9.5 9.6 10.0 9.7   BILIRUBIN mg/dL  --   --   --  1.7* 1.0   ALK PHOS U/L  --   --   --  88 100   ALT (SGPT) U/L  --   --    --  20 15   AST (SGOT) U/L  --   --   --  29 19   GLUCOSE mg/dL 331* 327* 205* 342* 308*     Results from last 7 days   Lab Units 04/30/22  0555 04/29/22  0542 04/27/22  2335   WBC 10*3/mm3 6.06 8.07 5.99   HEMOGLOBIN g/dL 15.9 18.1* 15.7   HEMATOCRIT % 45.7 51.7* 44.0   PLATELETS 10*3/mm3 234 162 264         Results from last 7 days   Lab Units 04/28/22  0203 04/27/22  2335   TROPONIN T ng/mL <0.010 <0.010             I have reviewed the patient's laboratory results.    Radiology results:    No radiology results from the last 24 hrs  I have reviewed the patient's radiology reports.    Medication Review:     I have reviewed the patient's active and prn medications.     Problem List:      Type 1 diabetes mellitus with unspecified complications (HCC)    Gastro-esophageal reflux disease without esophagitis    Chest pain in adult    Severe malnutrition (HCC)    History of anoxic brain injury    Impaired mobility and ADLs      Assessment:    Type 1 Diabetes, uncontrolled  History of anoxic brain injury with seizures  Anxiety  Inability to complete ADLs independently  Neurogenic Hypertension  Muscle Spasticity  Malnutrition    Plan:    Type 1 Diabetes Mellitus  -Levemir 11 units daily after breakfast- will titrate up as tolerated  - FSBS extremely erratic  - Monitor for hyper-hypoglycemia closely- will hold sliding scale if patient consumes < 25% meal given hypoglycemia noted.     Seizures  -Continue Keppra per home dose     Anxiety  -Continue with Hydroxyzine PRN     Inability to complete ADLs independently  -Consult PT/OT/Speech on admission  -Reported to be ambulatory at baseline but limited due to contractures     Neurogenic Hypertension  -Continue with Propranolol per home dose     Muscle Spasticity  -Continue with Baclofen and Artane home dosing    Malnutrition  -Nutritionist consulted. Boost ordered.    1322- Attempted to update brother Joey Delgado. No answer.     DVT Prophylaxis: Lovenox  Code Status: Full  Code  Diet: Consistent Carb  Discharge Plan: Pending LTC placement-Thursday    Zoya Haro, APRN  05/02/22  13:07 EDT    Dictated utilizing Dragon dictation.

## 2022-05-02 NOTE — PLAN OF CARE
Goal Outcome Evaluation:  Plan of Care Reviewed With: patient        Progress: no change  Outcome Evaluation: VSS, NO ACUTE CHANGES OVERNIGHT.

## 2022-05-02 NOTE — PLAN OF CARE
Goal Outcome Evaluation:  Plan of Care Reviewed With: patient        Progress: improving  Outcome Evaluation: Pt received supine in bed, modified independence for bed mobility.  Sat eob and attempted to wash his face, pt needs mod assist as his tone will not allow him to provide adequate pressure to wash thoroughly.  Pt stood at sink for oral care tasks, max assist to brush his teeth again d/t increased tone in upper body.  Cont OT per POC

## 2022-05-03 LAB
ANION GAP SERPL CALCULATED.3IONS-SCNC: 11.4 MMOL/L (ref 5–15)
BUN SERPL-MCNC: 19 MG/DL (ref 6–20)
BUN/CREAT SERPL: 26 (ref 7–25)
CALCIUM SPEC-SCNC: 8.8 MG/DL (ref 8.6–10.5)
CHLORIDE SERPL-SCNC: 96 MMOL/L (ref 98–107)
CO2 SERPL-SCNC: 24.6 MMOL/L (ref 22–29)
CREAT SERPL-MCNC: 0.73 MG/DL (ref 0.76–1.27)
EGFRCR SERPLBLD CKD-EPI 2021: 124.7 ML/MIN/1.73
GLUCOSE BLDC GLUCOMTR-MCNC: 154 MG/DL (ref 70–130)
GLUCOSE BLDC GLUCOMTR-MCNC: 240 MG/DL (ref 70–130)
GLUCOSE BLDC GLUCOMTR-MCNC: 288 MG/DL (ref 70–130)
GLUCOSE BLDC GLUCOMTR-MCNC: 324 MG/DL (ref 70–130)
GLUCOSE BLDC GLUCOMTR-MCNC: 328 MG/DL (ref 70–130)
GLUCOSE SERPL-MCNC: 313 MG/DL (ref 65–99)
POTASSIUM SERPL-SCNC: 4.2 MMOL/L (ref 3.5–5.2)
SARS-COV-2 RNA PNL SPEC NAA+PROBE: NOT DETECTED
SODIUM SERPL-SCNC: 132 MMOL/L (ref 136–145)

## 2022-05-03 PROCEDURE — 87635 SARS-COV-2 COVID-19 AMP PRB: CPT | Performed by: NURSE PRACTITIONER

## 2022-05-03 PROCEDURE — 63710000001 INSULIN DETEMIR PER 5 UNITS: Performed by: NURSE PRACTITIONER

## 2022-05-03 PROCEDURE — 96372 THER/PROPH/DIAG INJ SC/IM: CPT

## 2022-05-03 PROCEDURE — G0378 HOSPITAL OBSERVATION PER HR: HCPCS

## 2022-05-03 PROCEDURE — 97116 GAIT TRAINING THERAPY: CPT

## 2022-05-03 PROCEDURE — 97530 THERAPEUTIC ACTIVITIES: CPT

## 2022-05-03 PROCEDURE — 99225 PR SBSQ OBSERVATION CARE/DAY 25 MINUTES: CPT | Performed by: NURSE PRACTITIONER

## 2022-05-03 PROCEDURE — 82962 GLUCOSE BLOOD TEST: CPT

## 2022-05-03 PROCEDURE — 80048 BASIC METABOLIC PNL TOTAL CA: CPT | Performed by: NURSE PRACTITIONER

## 2022-05-03 PROCEDURE — 25010000002 ENOXAPARIN PER 10 MG: Performed by: NURSE PRACTITIONER

## 2022-05-03 PROCEDURE — 63710000001 INSULIN REGULAR HUMAN PER 5 UNITS: Performed by: NURSE PRACTITIONER

## 2022-05-03 RX ORDER — BISACODYL 10 MG
10 SUPPOSITORY, RECTAL RECTAL DAILY PRN
Status: DISCONTINUED | OUTPATIENT
Start: 2022-05-03 | End: 2022-05-05 | Stop reason: HOSPADM

## 2022-05-03 RX ADMIN — ENOXAPARIN SODIUM 40 MG: 40 INJECTION SUBCUTANEOUS at 15:42

## 2022-05-03 RX ADMIN — BACLOFEN 20 MG: 10 TABLET ORAL at 08:41

## 2022-05-03 RX ADMIN — HUMAN INSULIN 5 UNITS: 100 INJECTION, SOLUTION SUBCUTANEOUS at 12:13

## 2022-05-03 RX ADMIN — HUMAN INSULIN 2 UNITS: 100 INJECTION, SOLUTION SUBCUTANEOUS at 06:43

## 2022-05-03 RX ADMIN — Medication 10 ML: at 21:07

## 2022-05-03 RX ADMIN — PROPRANOLOL HYDROCHLORIDE 20 MG: 20 TABLET ORAL at 08:41

## 2022-05-03 RX ADMIN — TRIHEXYPHENIDYL HYDROCHLORIDE 2 MG: 2 TABLET ORAL at 12:13

## 2022-05-03 RX ADMIN — HUMAN INSULIN 4 UNITS: 100 INJECTION, SOLUTION SUBCUTANEOUS at 18:14

## 2022-05-03 RX ADMIN — TRIHEXYPHENIDYL HYDROCHLORIDE 2 MG: 2 TABLET ORAL at 18:14

## 2022-05-03 RX ADMIN — TRIHEXYPHENIDYL HYDROCHLORIDE 2 MG: 2 TABLET ORAL at 08:40

## 2022-05-03 RX ADMIN — HYDROXYZINE HYDROCHLORIDE 25 MG: 25 TABLET, FILM COATED ORAL at 21:11

## 2022-05-03 RX ADMIN — PROPRANOLOL HYDROCHLORIDE 20 MG: 20 TABLET ORAL at 15:42

## 2022-05-03 RX ADMIN — Medication 10 ML: at 08:44

## 2022-05-03 RX ADMIN — LEVETIRACETAM 500 MG: 500 TABLET, FILM COATED ORAL at 08:41

## 2022-05-03 RX ADMIN — CITALOPRAM HYDROBROMIDE 40 MG: 20 TABLET ORAL at 08:41

## 2022-05-03 RX ADMIN — BACLOFEN 20 MG: 10 TABLET ORAL at 21:07

## 2022-05-03 RX ADMIN — PROPRANOLOL HYDROCHLORIDE 20 MG: 20 TABLET ORAL at 21:07

## 2022-05-03 RX ADMIN — BACLOFEN 20 MG: 10 TABLET ORAL at 15:42

## 2022-05-03 RX ADMIN — LEVETIRACETAM 500 MG: 500 TABLET, FILM COATED ORAL at 21:07

## 2022-05-03 RX ADMIN — INSULIN DETEMIR 11 UNITS: 100 INJECTION, SOLUTION SUBCUTANEOUS at 09:16

## 2022-05-03 NOTE — PLAN OF CARE
Goal Outcome Evaluation:  Plan of Care Reviewed With: patient        Progress: improving  Outcome Evaluation: Pt recieved supine and willing to particiapte. Pt performed bed mobility with cga/min to transfer to and from EOB. Pt was cga/min a to perform sts and stand to sit.  Pt was able to ambulate 100' x 5 cga/min a with standing rests.  Pt required vc's to reduce gait speed to decreased scissoring.  See flcowsheet for details

## 2022-05-03 NOTE — CASE MANAGEMENT/SOCIAL WORK
Continued Stay Note   Prem     Patient Name: Stephen Delgado  MRN: [id number]  Today's Date: 5/3/2022    Admit Date: 4/27/2022     Discharge Plan     Row Name 05/03/22 1209       Plan    Plan Comments GAUTAM was contacted by Maryse from [facility]. Maryse asked if we could send the feeding tube orders (type, rate) for pt. GAUTAM informed her that pt does not have a feeding tube. Maryse clarified if pt was on vent. GAUTAM told her pt is not. Maryse stated they are patiently awaiting his arrival.                               Expected Discharge Date and Time     Expected Discharge Date Expected Discharge Time    May 5, 2022         JEFFERY Ruiz

## 2022-05-03 NOTE — PLAN OF CARE
Goal Outcome Evaluation:  Plan of Care Reviewed With: patient        Progress: no change  Outcome Evaluation: No c/o pain noted overnight. Pt rested well. placement planned for Thursday.
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
## 2022-05-03 NOTE — CASE MANAGEMENT/SOCIAL WORK
Continued Stay Note   Prem     Patient Name: Stephen Delgado  MRN: [id number]  Today's Date: 5/3/2022    Admit Date: 4/27/2022     Discharge Plan     Row Name 05/03/22 1517       Plan    Plan since pt is being transported early 5/5 8 Am Dc summary will need to be faxed  [phone] by 1500  5/4 and nursing to call report by  1500 5/4  to [phone] [place]  Will need a COVID test prior to discharge               Discharge Codes    No documentation.               Expected Discharge Date and Time     Expected Discharge Date Expected Discharge Time    May 5, 2022             Jodie Hughes RN

## 2022-05-03 NOTE — PROGRESS NOTES
[facility]    Name:  Stephen Delgado   Age:  31 y.o.  Sex:  male  :  1990  MRN:  [id number]   Visit Number:  [id number]  Admission Date:  2022  Date Of Service:  22  Primary Care Physician:  Caryl Mayer APRN      LOS:  6 days    Chief Complaint:      LTC placement    Subjective:    Patient seen and evaluated this morning.  Prior provider documentation reviewed from over the weekend.  No family present at bedside on my exam.  Patient with garbled speech at baseline and very hard to understand.  Patient does nod head no and will raise his head and shoulders off the pillow for yes if you give him time to respond. Patient smiling and tells me that he had a bowel movement.  Tech advised that she got him up to toilet and he had large BM earlier.  Patient is pending transport to facility in [place] hopefully for Thursday this week.  Requesting Boost.    Hospital Course:    Patient is a 31 year old male who presented to the emergency department via EMS with complaints of chest pain reported by family.  Patient with past health history of Type 1 Diabetes, anoxic brain injury, and anxiety with garbled speech at baseline.  His legal guardian is his brother who advised that patient complained of chest pain and overall not feeling well with erratic blood sugars.  Brother advised when arriving to the hospital that he was unable to care for him any longer and wanted placement.    Prior anoxic brain injury was due to DKA and code blue, found down by brother and achieved ROSC, then coded again.  He ambulates at baseline and can alert others if his sugars are low, is able to urinate alone but needs assistance with bowel movements due to hand contractures.  Patient was doing well until Covid. He has been unable to receive any therapy for approximately 1.5 years.  Prior to Covid patient could hold a spoon and feed himself.  Brother has been sole caregiver for the  past 5 years.   Hospitalist was contacted for admission. PT/OT/Case Management following patient. Patient accepted at nursing facility in [place], [place] for which transportation available Thursday or sooner.    Review of Systems:     All systems were reviewed and negative except as mentioned in subjective, assessment and plan.    Vital Signs:    Temp:  [97.3 °F (36.3 °C)-97.5 °F (36.4 °C)] 97.4 °F (36.3 °C)  Heart Rate:  [71-90] 87  Resp:  [18] 18  BP: ()/(74-95) 98/74    Intake and output:    I/O last 3 completed shifts:  In: 2457 [P.O.:2457]  Out: -   I/O this shift:  In: 200 [P.O.:200]  Out: -     Physical Examination:    General Appearance:  Alert and cooperative, pleasant young male, no acute distress on exam   Head:  Atraumatic and normocephalic.   Eyes: Conjunctivae and sclerae normal, no icterus. No pallor.   Throat: No oral lesions, no thrush, oral mucosa moist.   Neck: Supple, trachea midline   Lungs:   Breath sounds heard bilaterally equally.  No wheezing or crackles. Unlabored on room air   Heart:  Normal S1 and S2, RRR, no murmur, no gallop, no rub. No JVD.   Abdomen:   Normal bowel sounds, no masses, no organomegaly. Soft, nontender, nondistended, no rebound tenderness.   Extremities: Supple, no edema, no cyanosis, no clubbing, contractures to bilateral hands- extremity wasting is present.   Skin: No bleeding or rash.   Neurologic: Alert- nonverbal at baseline with garbled speech from anoxic brain injury. Some speech comprehensible at times. No facial asymmetry. Moves all four limbs.      Laboratory results:    Results from last 7 days   Lab Units 05/03/22  0538 05/02/22  0616 05/01/22  0647 04/30/22  0555 04/29/22  0542 04/27/22  3055   SODIUM mmol/L 132* 137 136   < > 131* 135*   POTASSIUM mmol/L 4.2 4.3 4.4   < > 5.4* 4.7   CHLORIDE mmol/L 96* 99 94*   < > 92* 95*   CO2 mmol/L 24.6 30.8* 33.9*   < > 24.9 30.5*   BUN mg/dL 19 21* 20   < > 19 13   CREATININE mg/dL 0.73* 0.73* 0.78   < > 0.94 1.00    CALCIUM mg/dL 8.8 9.1 9.5   < > 10.0 9.7   BILIRUBIN mg/dL  --   --   --   --  1.7* 1.0   ALK PHOS U/L  --   --   --   --  88 100   ALT (SGPT) U/L  --   --   --   --  20 15   AST (SGOT) U/L  --   --   --   --  29 19   GLUCOSE mg/dL 313* 331* 327*   < > 342* 308*    < > = values in this interval not displayed.     Results from last 7 days   Lab Units 04/30/22  0555 04/29/22  0542 04/27/22  2335   WBC 10*3/mm3 6.06 8.07 5.99   HEMOGLOBIN g/dL 15.9 18.1* 15.7   HEMATOCRIT % 45.7 51.7* 44.0   PLATELETS 10*3/mm3 234 162 264         Results from last 7 days   Lab Units 04/28/22  0203 04/27/22  2335   TROPONIN T ng/mL <0.010 <0.010             I have reviewed the patient's laboratory results.    Radiology results:    No radiology results from the last 24 hrs  I have reviewed the patient's radiology reports.    Medication Review:     I have reviewed the patient's active and prn medications.     Problem List:      Type 1 diabetes mellitus with unspecified complications (HCC)    Gastro-esophageal reflux disease without esophagitis    Chest pain in adult    Severe malnutrition (HCC)    History of anoxic brain injury    Impaired mobility and ADLs      Assessment:    Type 1 Diabetes, uncontrolled  History of anoxic brain injury with seizures  Anxiety  Inability to complete ADLs independently  Neurogenic Hypertension  Muscle Spasticity  Malnutrition    Plan:    Type 1 Diabetes Mellitus  -Levemir 11 units daily after breakfast- will titrate up as tolerated  - FSBS extremely erratic since admission  - Monitor for hyper-hypoglycemia closely- will hold sliding scale if patient consumes < 25% meal given hypoglycemia noted.     Seizures  -Continue Keppra per home dose     Anxiety  -Continue with Hydroxyzine PRN     Inability to complete ADLs independently  -Consult PT/OT/Speech on admission  -Reported to be ambulatory at baseline but limited due to contractures     Neurogenic Hypertension  -Continue with Propranolol per home  dose     Muscle Spasticity  -Continue with Baclofen and Artane home dosing    Malnutrition  -Nutritionist consulted. Boost ordered.      DVT Prophylaxis: Lovenox  Code Status: Full Code  Diet: Consistent Carb  Discharge Plan: Pending LTC placement-Thursday    Margaret Figueroa, APRN  05/03/22  11:22 EDT    Dictated utilizing Dragon dictation.

## 2022-05-04 LAB
ANION GAP SERPL CALCULATED.3IONS-SCNC: 12 MMOL/L (ref 5–15)
BUN SERPL-MCNC: 20 MG/DL (ref 6–20)
BUN/CREAT SERPL: 25.3 (ref 7–25)
CALCIUM SPEC-SCNC: 9.3 MG/DL (ref 8.6–10.5)
CHLORIDE SERPL-SCNC: 93 MMOL/L (ref 98–107)
CO2 SERPL-SCNC: 27 MMOL/L (ref 22–29)
CREAT SERPL-MCNC: 0.79 MG/DL (ref 0.76–1.27)
EGFRCR SERPLBLD CKD-EPI 2021: 121.8 ML/MIN/1.73
GLUCOSE BLDC GLUCOMTR-MCNC: 156 MG/DL (ref 70–130)
GLUCOSE BLDC GLUCOMTR-MCNC: 240 MG/DL (ref 70–130)
GLUCOSE BLDC GLUCOMTR-MCNC: 325 MG/DL (ref 70–130)
GLUCOSE BLDC GLUCOMTR-MCNC: 331 MG/DL (ref 70–130)
GLUCOSE BLDC GLUCOMTR-MCNC: 385 MG/DL (ref 70–130)
GLUCOSE SERPL-MCNC: 353 MG/DL (ref 65–99)
POTASSIUM SERPL-SCNC: 4.4 MMOL/L (ref 3.5–5.2)
SODIUM SERPL-SCNC: 132 MMOL/L (ref 136–145)

## 2022-05-04 PROCEDURE — G0378 HOSPITAL OBSERVATION PER HR: HCPCS

## 2022-05-04 PROCEDURE — 63710000001 INSULIN REGULAR HUMAN PER 5 UNITS: Performed by: NURSE PRACTITIONER

## 2022-05-04 PROCEDURE — 25010000002 ENOXAPARIN PER 10 MG: Performed by: NURSE PRACTITIONER

## 2022-05-04 PROCEDURE — 99225 PR SBSQ OBSERVATION CARE/DAY 25 MINUTES: CPT | Performed by: NURSE PRACTITIONER

## 2022-05-04 PROCEDURE — 63710000001 INSULIN DETEMIR PER 5 UNITS: Performed by: NURSE PRACTITIONER

## 2022-05-04 PROCEDURE — 97535 SELF CARE MNGMENT TRAINING: CPT

## 2022-05-04 PROCEDURE — 96372 THER/PROPH/DIAG INJ SC/IM: CPT

## 2022-05-04 PROCEDURE — 97116 GAIT TRAINING THERAPY: CPT

## 2022-05-04 PROCEDURE — 80048 BASIC METABOLIC PNL TOTAL CA: CPT | Performed by: NURSE PRACTITIONER

## 2022-05-04 PROCEDURE — 97530 THERAPEUTIC ACTIVITIES: CPT

## 2022-05-04 PROCEDURE — 82962 GLUCOSE BLOOD TEST: CPT

## 2022-05-04 RX ORDER — HYDROXYZINE HYDROCHLORIDE 25 MG/1
25 TABLET, FILM COATED ORAL 3 TIMES DAILY PRN
Start: 2022-05-04

## 2022-05-04 RX ADMIN — LEVETIRACETAM 500 MG: 500 TABLET, FILM COATED ORAL at 08:32

## 2022-05-04 RX ADMIN — PROPRANOLOL HYDROCHLORIDE 20 MG: 20 TABLET ORAL at 08:33

## 2022-05-04 RX ADMIN — HUMAN INSULIN 5 UNITS: 100 INJECTION, SOLUTION SUBCUTANEOUS at 17:27

## 2022-05-04 RX ADMIN — Medication 10 ML: at 08:33

## 2022-05-04 RX ADMIN — HUMAN INSULIN 5 UNITS: 100 INJECTION, SOLUTION SUBCUTANEOUS at 11:57

## 2022-05-04 RX ADMIN — BACLOFEN 20 MG: 10 TABLET ORAL at 21:19

## 2022-05-04 RX ADMIN — INSULIN DETEMIR 11 UNITS: 100 INJECTION, SOLUTION SUBCUTANEOUS at 08:33

## 2022-05-04 RX ADMIN — LEVETIRACETAM 500 MG: 500 TABLET, FILM COATED ORAL at 21:19

## 2022-05-04 RX ADMIN — ENOXAPARIN SODIUM 40 MG: 40 INJECTION SUBCUTANEOUS at 16:42

## 2022-05-04 RX ADMIN — PROPRANOLOL HYDROCHLORIDE 20 MG: 20 TABLET ORAL at 16:42

## 2022-05-04 RX ADMIN — TRIHEXYPHENIDYL HYDROCHLORIDE 2 MG: 2 TABLET ORAL at 17:27

## 2022-05-04 RX ADMIN — TRIHEXYPHENIDYL HYDROCHLORIDE 2 MG: 2 TABLET ORAL at 11:56

## 2022-05-04 RX ADMIN — PROPRANOLOL HYDROCHLORIDE 20 MG: 20 TABLET ORAL at 21:19

## 2022-05-04 RX ADMIN — TRIHEXYPHENIDYL HYDROCHLORIDE 2 MG: 2 TABLET ORAL at 08:33

## 2022-05-04 RX ADMIN — HYDROXYZINE HYDROCHLORIDE 25 MG: 25 TABLET, FILM COATED ORAL at 21:19

## 2022-05-04 RX ADMIN — BACLOFEN 20 MG: 10 TABLET ORAL at 08:33

## 2022-05-04 RX ADMIN — HUMAN INSULIN 6 UNITS: 100 INJECTION, SOLUTION SUBCUTANEOUS at 07:00

## 2022-05-04 RX ADMIN — BACLOFEN 20 MG: 10 TABLET ORAL at 16:42

## 2022-05-04 RX ADMIN — Medication 10 ML: at 21:19

## 2022-05-04 RX ADMIN — CITALOPRAM HYDROBROMIDE 40 MG: 20 TABLET ORAL at 08:32

## 2022-05-04 NOTE — DISCHARGE SUMMARY
[facility]   DISCHARGE SUMMARY      Name:  Stephen Delgado   Age:  31 y.o.  Sex:  male  :  1990  MRN:  [id number]   Visit Number:  [id number]    Admission Date:  2022  Date of Discharge:  2022  Primary Care Physician:  Caryl Mayer APRN    Important issues to note:    1.  Admitted to the hospital for LTC placement and reports of chest pain.    2.  Case management consulted on admission as well as social work.      3.  Patient has been accepted at [facility] in [place], [place].  Transportation has been arranged.      4.  Patient known to be brittle diabetic and very sensitive to insulin and any insulin adjustments.  Will need frequent blood glucose monitoring at facility.    5.  Stable for discharge to facility with transportation arranged for .    Discharge Diagnoses:     Type 1 Diabetes, uncontrolled  History of anoxic brain injury with seizures  Anxiety  Inability to complete ADLs independently  Neurogenic Hypertension  Muscle Spasticity  Malnutrition    Problem List:     Active Hospital Problems    Diagnosis  POA   • History of anoxic brain injury [Z86.69]  Not Applicable   • Impaired mobility and ADLs [Z74.09, Z78.9]  Yes   • Severe malnutrition (HCC) [E43]  Yes   • Chest pain in adult [R07.9]  Yes   • Gastro-esophageal reflux disease without esophagitis [K21.9]  Yes   • Type 1 diabetes mellitus with unspecified complications (HCC) [E10.8]  Yes   • Requires daily assistance for activities of daily living (ADL) and comfort needs [Z74.1]  Yes   • Underweight [R63.6]  Yes   • Neurogenic dysphagia [R13.19]  Yes   • 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     Presenting Problem:    Chief Complaint   Patient presents with   • Chest Pain      Consults:     Consulting Physician(s)             None          History of presenting  illness/Hospital Course:    Patient is a 31 year old male who presented to the emergency department via EMS 4/27/2022 with complaints of chest pain reported by family.  Patient with past health history of Type 1 Diabetes Mellitus with erratic blood sugars, anoxic brain injury, and anxiety with garbled speech at baseline.  His legal guardian is his brother who advised that patient complained of chest pain and overall not feeling well with erratic blood sugars.  Brother advised when arriving to the hospital that he was unable to care for him any longer and wanted long term care placement.  Prior anoxic brain injury was due to DKA and subsequent cardiac arrest, found down by brother and achieved ROSC, then coded again.  He ambulates at baseline and can alert others if his sugars are low, is able to urinate alone but needs assistance with bowel movements due to hand contractures.  Patient was doing well with home therapies until Covid. He has been unable to receive any therapy for approximately 1.5 years.  Prior to Covid patient could hold a spoon and feed himself.  Brother has been sole caregiver for the past 5 years.      Case management consulted on admission. Patient was evaluated by physical, occupational, and speech therapies.  Patient does try to communicate with speech but is quite difficult to understand.  Patient will shake his head no and raise head and shoulders for yes if given time to respond.  Patient's blood sugars remained erratic on admission. Will continue with current Levemir at discharge and sliding scale coverage with recommendations to titrate slowly given sensitivity to insulin therapy with type I diabetes.  Will continue with the rest of patient's home medications.  Patient accepted at nursing facility[facility] in [place], [place].  Case management has arranged transportation for 5/5/2022.  Patient stable for discharge to long-term care.    Vital Signs:    Temp:  [97.5 °F (36.4  °C)-98.9 °F (37.2 °C)] 97.5 °F (36.4 °C)  Heart Rate:  [] 83  Resp:  [18-20] 18  BP: ()/(67-80) 97/67    Physical Exam:    General Appearance:  Alert and cooperative, young male, no acute distress on exam found resting in bed   Head:  Atraumatic and normocephalic.   Eyes: Conjunctivae and sclerae normal, no icterus. No pallor.   Ears:  Ears with no abnormalities noted.   Throat: No oral lesions, no thrush, oral mucosa moist.   Neck: Supple, trachea midline   Back:   No kyphoscoliosis present. No tenderness to palpation.   Lungs:   Breath sounds heard bilaterally equally.  No crackles or wheezing. Unlabored on room air   Heart:  Normal S1 and S2, RRR, no murmur, no gallop, no rub. No JVD.   Abdomen:   Normal bowel sounds, no masses, no organomegaly. Soft, nontender, nondistended, no rebound tenderness.   Extremities: Supple, no edema, no cyanosis, no clubbing, contractures to bilateral hands- extremity wasting is present.   Pulses: Pulses palpable bilaterally.   Skin: No bleeding or rash.   Neurologic:  Alert- nonverbal at baseline with garbled speech from anoxic brain injury. Some speech comprehensible at times, attempts to communicate with staff but very hard to understand. No facial asymmetry. Moves all four limbs. No tremors.     Pertinent Lab Results:     Results from last 7 days   Lab Units 05/04/22  0549 05/03/22  0538 05/02/22  0616 04/30/22  0555 04/29/22  0542 04/27/22  2335   SODIUM mmol/L 132* 132* 137   < > 131* 135*   POTASSIUM mmol/L 4.4 4.2 4.3   < > 5.4* 4.7   CHLORIDE mmol/L 93* 96* 99   < > 92* 95*   CO2 mmol/L 27.0 24.6 30.8*   < > 24.9 30.5*   BUN mg/dL 20 19 21*   < > 19 13   CREATININE mg/dL 0.79 0.73* 0.73*   < > 0.94 1.00   CALCIUM mg/dL 9.3 8.8 9.1   < > 10.0 9.7   BILIRUBIN mg/dL  --   --   --   --  1.7* 1.0   ALK PHOS U/L  --   --   --   --  88 100   ALT (SGPT) U/L  --   --   --   --  20 15   AST (SGOT) U/L  --   --   --   --  29 19   GLUCOSE mg/dL 353* 313* 331*   < > 342*  308*    < > = values in this interval not displayed.     Results from last 7 days   Lab Units 04/30/22  0555 04/29/22  0542 04/27/22  2335   WBC 10*3/mm3 6.06 8.07 5.99   HEMOGLOBIN g/dL 15.9 18.1* 15.7   HEMATOCRIT % 45.7 51.7* 44.0   PLATELETS 10*3/mm3 234 162 264         Results from last 7 days   Lab Units 04/28/22  0203 04/27/22  2335   TROPONIN T ng/mL <0.010 <0.010             Results from last 7 days   Lab Units 04/27/22  2335   LIPASE U/L 40               Pertinent Radiology Results:    Imaging Results (All)     Procedure Component Value Units Date/Time    XR Chest 1 View [[id number]] Collected: 04/28/22 1042     Updated: 04/28/22 1044    Narrative:      PROCEDURE: XR CHEST 1 VW-     HISTORY: cp     COMPARISON: 2/25/2022.     FINDINGS: The heart is normal in size. The mediastinum is unremarkable.  The lungs are clear. There is no pneumothorax.  There are no acute  osseous abnormalities.       Impression:      No acute cardiopulmonary process.     Continued followup is recommended.     This report was signed and finalized on 4/28/2022 10:42 AM by Bailey Villegas M.D..        Condition on Discharge:      Stable.    Code status during the hospital stay:    Code Status and Medical Interventions:   Ordered at: 04/28/22 1422     Level Of Support Discussed With:    Next of Kin (If No Surrogate)     Code Status (Patient has no pulse and is not breathing):    CPR (Attempt to Resuscitate)     Medical Interventions (Patient has pulse or is breathing):    Full Support     Discharge Disposition:    [facility] [place]    Discharge Medications:       Discharge Medications      New Medications      Instructions Start Date   insulin detemir 100 UNIT/ML injection  Commonly known as: LEVEMIR   11 Units, Subcutaneous, Daily   Start Date: May 5, 2022     insulin regular 100 UNIT/ML injection  Commonly known as: humuLIN R,novoLIN R   0-7 Units, Subcutaneous, Every 6 Hours Scheduled         Changes to  "Medications      Instructions Start Date   citalopram 40 MG tablet  Commonly known as: CeleXA  What changed: how much to take   Take 1 tablet by mouth once daily      hydrOXYzine 25 MG tablet  Commonly known as: ATARAX  What changed:   · reasons to take this  · additional instructions   25 mg, Oral, 3 Times Daily PRN      levETIRAcetam 500 MG tablet  Commonly known as: KEPPRA  What changed: Another medication with the same name was removed. Continue taking this medication, and follow the directions you see here.   500 mg, Oral, 2 Times Daily      propranolol 20 MG tablet  Commonly known as: INDERAL  What changed: when to take this   Take 1 tablet by mouth twice daily         Continue These Medications      Instructions Start Date   baclofen 20 MG tablet  Commonly known as: LIORESAL   20 mg, Oral, 3 Times Daily      BD Pen Needle Shelly 2nd Gen 32G X 4 MM misc  Generic drug: Insulin Pen Needle   USE 1 PEN NEEDLE EVERY 3 HOURS WITH  INSULIN  PEN      FreeStyle Yanna 14 Day Sensor misc   1 each, Does not apply, Every 14 Days      glucose blood test strip  Commonly known as: FREESTYLE LITE   Test 20 times per day      Insulin Syringe 31G X 5/16\" 0.5 ML misc   1 Units, Does not apply, Every 3 Hours      trihexyphenidyl 2 MG tablet  Commonly known as: ARTANE   2 mg, Oral, 3 Times Daily With Meals         Stop These Medications    BASAGLAR KWIKPEN 100 UNIT/ML injection pen     insulin aspart 100 UNIT/ML solution pen-injector sc pen  Commonly known as: NovoLOG FlexPen          Discharge Diet:     Diet Instructions     Diet: Regular, Consistent Carbohydrate; Thin      Discharge Diet:  Regular  Consistent Carbohydrate       Fluid Consistency: Thin    Uses sippy cup, don't give bread, feeder, eats better laying back slightly.        Activity at Discharge:     Activity Instructions     Up WIth Assist          Follow-up Appointments:     Contact information for after-discharge care     Destination     [facility] .    Service: Skilled Nursing  Contact information:  [address] [place] [address]  [phone]                           Future Appointments   Date Time Provider Department Center   5/5/2022  8:00 AM EMS 1 [facility]   6/30/2022  2:30 PM Noemi Godfrey MD [facility] Figueroa, JASWANT  05/04/22  13:22 EDT    Time: I spent 28 minutes on this discharge activity which included: face-to-face encounter with the patient, reviewing the data in the system, coordination of the care with the nursing staff as well as consultants, documentation, and entering orders.     Dictated utilizing Dragon dictation.
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
## 2022-05-04 NOTE — PLAN OF CARE
Goal Outcome Evaluation:              Outcome Evaluation: VSS.  No acute events noted this shift.  Plan for PT t o DC to [facility] on May 5.

## 2022-05-04 NOTE — PLAN OF CARE
Goal Outcome Evaluation:  Plan of Care Reviewed With: patient        Progress: improving  Outcome Evaluation: Pt willing to participate with therapy.  Pt requires decreased assistance with bed mobility this treatment.  Pt was able to transfer to and from EOB with mod ind with vc's and extended time.  PT was cga/sba to perform sts transfer and ambulate 100' x 4 cga min a with use of gaitbelt.  Pt prefores to ambulate with shoes.  See flowsheet for details

## 2022-05-04 NOTE — PLAN OF CARE
Goal Outcome Evaluation:  Plan of Care Reviewed With: patient        Progress: improving  Outcome Evaluation: OT tx completed. Patient supine in bed, completed bed mobility with CGA, sat EOB and completed UBD with mod A, completed tf and functional mobility to bathroom with CGA, stood at sink side and completed face washing and oral hygiene with mod-max A. Returned to bed with feet elevated, patient c/o pain from pressure injury to R 2nd toe. Notified RN. Continue OT POC

## 2022-05-04 NOTE — PROGRESS NOTES
[facility] HOSPITALIST    PROGRESS NOTE    Name:  Stephen Delgado   Age:  31 y.o.  Sex:  male  :  1990  MRN:  [id number]   Visit Number:  [id number]  Admission Date:  2022  Date Of Service:  22  Primary Care Physician:  Caryl Mayer APRN      LOS:  7 days    Chief Complaint:      LTC placement    Subjective:    Patient seen and evaluated this morning found smiling resting in bed.  He immediately tells me that he needs to go to the bathroom for a bowel movement.  There is no family present at bedside.  Patient does nod head no and will raise his head and shoulders off the pillow for yes if you give him time to respond. Patient does not have complaints on exam.  Assisted to the bathroom with nursing staff.  Ambulates well with assistance.    Hospital Course:    Patient is a 31 year old male who presented to the emergency department via EMS with complaints of chest pain reported by family.  Patient with past health history of Type 1 Diabetes, anoxic brain injury, and anxiety with garbled speech at baseline.  His legal guardian is his brother who advised that patient complained of chest pain and overall not feeling well with erratic blood sugars.  Brother advised when arriving to the hospital that he was unable to care for him any longer and wanted placement.    Prior anoxic brain injury was due to DKA and code blue, found down by brother and achieved ROSC, then coded again.  He ambulates at baseline and can alert others if his sugars are low, is able to urinate alone but needs assistance with bowel movements due to hand contractures.  Patient was doing well until Covid. He has been unable to receive any therapy for approximately 1.5 years.  Prior to Covid patient could hold a spoon and feed himself.  Brother has been sole caregiver for the past 5 years.   Hospitalist was contacted for admission. PT/OT/Case Management following patient. Patient accepted at nursing facility  in [place], [place] for which transportation available Thursday or sooner.    Review of Systems:     All systems were reviewed and negative except as mentioned in subjective, assessment and plan.    Vital Signs:    Temp:  [97.5 °F (36.4 °C)-98.9 °F (37.2 °C)] 97.5 °F (36.4 °C)  Heart Rate:  [] 83  Resp:  [18-20] 18  BP: ()/(67-80) 97/67    Intake and output:    I/O last 3 completed shifts:  In: 1397 [P.O.:1397]  Out: 1300 [Urine:1000; Stool:300]  I/O this shift:  In: 960 [P.O.:960]  Out: 600 [Urine:600]    Physical Examination:    General Appearance:  Alert and cooperative, pleasant young male, no acute distress on exam   Head:  Atraumatic and normocephalic.   Eyes: Conjunctivae and sclerae normal, no icterus. No pallor.   Throat: No oral lesions, no thrush, oral mucosa moist.   Neck: Supple, trachea midline   Lungs:   Breath sounds heard bilaterally equally.  No wheezing or crackles. Unlabored on room air   Heart:  Normal S1 and S2, RRR, no murmur, no gallop, no rub. No JVD.   Abdomen:   Normal bowel sounds, no masses, no organomegaly. Soft, nontender, nondistended, no rebound tenderness.   Extremities: Supple, no edema, no cyanosis, no clubbing, contractures to bilateral hands- extremity wasting is present.   Skin: No bleeding or rash.   Neurologic: Alert- nonverbal at baseline with garbled speech from anoxic brain injury. Some speech comprehensible at times. No facial asymmetry. Moves all four limbs.      Laboratory results:    Results from last 7 days   Lab Units 05/04/22  0549 05/03/22  0538 05/02/22  0616 04/30/22  0555 04/29/22  0542 04/27/22  1525   SODIUM mmol/L 132* 132* 137   < > 131* 135*   POTASSIUM mmol/L 4.4 4.2 4.3   < > 5.4* 4.7   CHLORIDE mmol/L 93* 96* 99   < > 92* 95*   CO2 mmol/L 27.0 24.6 30.8*   < > 24.9 30.5*   BUN mg/dL 20 19 21*   < > 19 13   CREATININE mg/dL 0.79 0.73* 0.73*   < > 0.94 1.00   CALCIUM mg/dL 9.3 8.8 9.1   < > 10.0 9.7   BILIRUBIN mg/dL  --   --   --   --  1.7* 1.0   ALK  PHOS U/L  --   --   --   --  88 100   ALT (SGPT) U/L  --   --   --   --  20 15   AST (SGOT) U/L  --   --   --   --  29 19   GLUCOSE mg/dL 353* 313* 331*   < > 342* 308*    < > = values in this interval not displayed.     Results from last 7 days   Lab Units 04/30/22  0555 04/29/22  0542 04/27/22  2335   WBC 10*3/mm3 6.06 8.07 5.99   HEMOGLOBIN g/dL 15.9 18.1* 15.7   HEMATOCRIT % 45.7 51.7* 44.0   PLATELETS 10*3/mm3 234 162 264         Results from last 7 days   Lab Units 04/28/22  0203 04/27/22  2335   TROPONIN T ng/mL <0.010 <0.010             I have reviewed the patient's laboratory results.    Radiology results:    No radiology results from the last 24 hrs  I have reviewed the patient's radiology reports.    Medication Review:     I have reviewed the patient's active and prn medications.     Problem List:      Anoxic brain damage (HCC)    Contracture of left wrist    Underweight    Neurogenic dysphagia    Type 1 diabetes mellitus with unspecified complications (HCC)    Requires daily assistance for activities of daily living (ADL) and comfort needs    Gastro-esophageal reflux disease without esophagitis    Chest pain in adult    Severe malnutrition (HCC)    History of anoxic brain injury    Impaired mobility and ADLs      Assessment:    Type 1 Diabetes, uncontrolled  History of anoxic brain injury with seizures  Anxiety  Inability to complete ADLs independently  Neurogenic Hypertension  Muscle Spasticity  Malnutrition    Plan:    Type 1 Diabetes Mellitus  -Will increase Levemir to 12 units daily after breakfast- will titrate up as tolerated  - FSBS extremely erratic since admission  - Monitor for hyper-hypoglycemia closely- will hold sliding scale if patient consumes < 25% meal given hypoglycemia noted.     Seizures  -Continue Keppra per home dose     Anxiety  -Continue with Hydroxyzine PRN     Inability to complete ADLs independently  -Consult PT/OT/Speech on admission  -Reported to be ambulatory at baseline but  limited due to contractures     Neurogenic Hypertension  -Continue with Propranolol per home dose     Muscle Spasticity  -Continue with Baclofen and Artane home dosing    Malnutrition  -Nutritionist consulted. Boost ordered.      DVT Prophylaxis: Lovenox  Code Status: Full Code  Diet: Consistent Carb  Discharge Plan: Pending LTC placement-Thursday    Margaret Figueroa, APRN  05/04/22  13:23 EDT    Dictated utilizing Dragon dictation.

## 2022-05-04 NOTE — PLAN OF CARE
Goal Outcome Evaluation:           Problem: Adult Inpatient Plan of Care  Goal: Plan of Care Review  Outcome: Ongoing, Progressing  Goal: Patient-Specific Goal (Individualized)  Outcome: Ongoing, Progressing  Goal: Absence of Hospital-Acquired Illness or Injury  Outcome: Ongoing, Progressing  Intervention: Identify and Manage Fall Risk  Recent Flowsheet Documentation  Taken 5/4/2022 1600 by Caryl Rene RN  Safety Promotion/Fall Prevention:   safety round/check completed   room organization consistent   clutter free environment maintained   assistive device/personal items within reach   activity supervised   fall prevention program maintained   nonskid shoes/slippers when out of bed  Taken 5/4/2022 1400 by Caryl Rene, RN  Safety Promotion/Fall Prevention:   safety round/check completed   room organization consistent   nonskid shoes/slippers when out of bed   clutter free environment maintained   assistive device/personal items within reach   activity supervised   fall prevention program maintained  Taken 5/4/2022 1200 by Caryl Rene RN  Safety Promotion/Fall Prevention:   safety round/check completed   room organization consistent   nonskid shoes/slippers when out of bed   fall prevention program maintained   clutter free environment maintained   assistive device/personal items within reach   activity supervised  Taken 5/4/2022 1000 by Caryl Rene RN  Safety Promotion/Fall Prevention:   safety round/check completed   room organization consistent   nonskid shoes/slippers when out of bed   fall prevention program maintained   clutter free environment maintained   assistive device/personal items within reach   activity supervised  Taken 5/4/2022 0800 by Caryl Rene, RN  Safety Promotion/Fall Prevention:   safety round/check completed   room organization consistent   nonskid shoes/slippers when out of bed   fall prevention program maintained   clutter free environment  maintained   assistive device/personal items within reach   activity supervised  Intervention: Prevent Skin Injury  Recent Flowsheet Documentation  Taken 5/4/2022 1600 by Caryl Rene RN  Body Position: position changed independently  Taken 5/4/2022 1400 by Caryl Rene RN  Body Position: position changed independently  Taken 5/4/2022 1200 by Caryl Rene RN  Body Position: position changed independently  Taken 5/4/2022 1000 by Caryl Rene RN  Body Position: position changed independently  Taken 5/4/2022 0800 by Caryl Rene RN  Body Position: position changed independently  Skin Protection:   adhesive use limited   transparent dressing maintained   skin-to-device areas padded   protective footwear used  Intervention: Prevent and Manage VTE (Venous Thromboembolism) Risk  Recent Flowsheet Documentation  Taken 5/4/2022 1600 by Caryl Rene RN  Activity Management: activity adjusted per tolerance  Taken 5/4/2022 1400 by Caryl Rene RN  Activity Management: activity adjusted per tolerance  Taken 5/4/2022 1200 by Caryl Rene RN  Activity Management: activity adjusted per tolerance  Taken 5/4/2022 1000 by Caryl Rene RN  Activity Management: activity adjusted per tolerance  Taken 5/4/2022 0800 by Caryl Rene RN  Activity Management: activity adjusted per tolerance  VTE Prevention/Management: (see MAR)   bilateral   dorsiflexion/plantar flexion performed  Intervention: Prevent Infection  Recent Flowsheet Documentation  Taken 5/4/2022 1600 by Caryl Rene RN  Infection Prevention:   environmental surveillance performed   equipment surfaces disinfected   hand hygiene promoted   personal protective equipment utilized   rest/sleep promoted   single patient room provided  Taken 5/4/2022 1400 by Caryl Rene RN  Infection Prevention:   environmental surveillance performed   equipment surfaces disinfected   hand hygiene promoted    personal protective equipment utilized   rest/sleep promoted   single patient room provided  Taken 5/4/2022 1200 by Caryl Rene RN  Infection Prevention:   environmental surveillance performed   equipment surfaces disinfected   hand hygiene promoted   personal protective equipment utilized   rest/sleep promoted   single patient room provided  Taken 5/4/2022 1000 by Caryl Rene RN  Infection Prevention:   environmental surveillance performed   equipment surfaces disinfected   hand hygiene promoted   personal protective equipment utilized   rest/sleep promoted   single patient room provided  Taken 5/4/2022 0800 by Caryl Rene RN  Infection Prevention:   environmental surveillance performed   equipment surfaces disinfected   hand hygiene promoted   personal protective equipment utilized   rest/sleep promoted   single patient room provided   visitors restricted/screened  Goal: Optimal Comfort and Wellbeing  Outcome: Ongoing, Progressing  Intervention: Provide Person-Centered Care  Recent Flowsheet Documentation  Taken 5/4/2022 0800 by Caryl Rene RN  Trust Relationship/Rapport:   care explained   choices provided   reassurance provided   questions answered   questions encouraged  Goal: Readiness for Transition of Care  Outcome: Ongoing, Progressing     Problem: Suicide Risk  Goal: Absence of Self-Harm  Outcome: Ongoing, Progressing  Intervention: Promote Psychosocial Wellbeing  Recent Flowsheet Documentation  Taken 5/4/2022 0800 by Caryl Rene RN  Supportive Measures:   active listening utilized   verbalization of feelings encouraged  Family/Support System Care:   support provided   self-care encouraged     Problem: Asthma Comorbidity  Goal: Maintenance of Asthma Control  Outcome: Ongoing, Progressing  Intervention: Maintain Asthma Symptom Control  Recent Flowsheet Documentation  Taken 5/4/2022 0800 by Caryl Rene RN  Medication Review/Management: medications  reviewed     Problem: Behavioral Health Comorbidity  Goal: Maintenance of Behavioral Health Symptom Control  Outcome: Ongoing, Progressing  Intervention: Maintain Behavioral Health Symptom Control  Recent Flowsheet Documentation  Taken 5/4/2022 0800 by Caryl Rene RN  Medication Review/Management: medications reviewed     Problem: COPD (Chronic Obstructive Pulmonary Disease) Comorbidity  Goal: Maintenance of COPD Symptom Control  Outcome: Ongoing, Progressing  Intervention: Maintain COPD-Symptom Control  Recent Flowsheet Documentation  Taken 5/4/2022 0800 by Caryl Rene RN  Supportive Measures:   active listening utilized   verbalization of feelings encouraged  Medication Review/Management: medications reviewed     Problem: Diabetes Comorbidity  Goal: Blood Glucose Level Within Targeted Range  Outcome: Ongoing, Progressing  Intervention: Monitor and Manage Glycemia  Recent Flowsheet Documentation  Taken 5/4/2022 0800 by Caryl Rene RN  Glycemic Management: blood glucose monitored     Problem: Heart Failure Comorbidity  Goal: Maintenance of Heart Failure Symptom Control  Outcome: Ongoing, Progressing  Intervention: Maintain Heart Failure-Management  Recent Flowsheet Documentation  Taken 5/4/2022 0800 by Caryl Rene RN  Medication Review/Management: medications reviewed     Problem: Hypertension Comorbidity  Goal: Blood Pressure in Desired Range  Outcome: Ongoing, Progressing  Intervention: Maintain Blood Pressure Management  Recent Flowsheet Documentation  Taken 5/4/2022 0800 by Caryl Rene RN  Medication Review/Management: medications reviewed     Problem: Obstructive Sleep Apnea Risk or Actual Comorbidity Management  Goal: Unobstructed Breathing During Sleep  Outcome: Ongoing, Progressing     Problem: Osteoarthritis Comorbidity  Goal: Maintenance of Osteoarthritis Symptom Control  Outcome: Ongoing, Progressing  Intervention: Maintain Osteoarthritis Symptom  Control  Recent Flowsheet Documentation  Taken 5/4/2022 1600 by Caryl Rene RN  Activity Management: activity adjusted per tolerance  Taken 5/4/2022 1400 by Caryl Rene RN  Activity Management: activity adjusted per tolerance  Assistive Device Utilized: gait belt  Taken 5/4/2022 1200 by Caryl Rene RN  Activity Management: activity adjusted per tolerance  Taken 5/4/2022 1000 by Caryl Rene RN  Activity Management: activity adjusted per tolerance  Taken 5/4/2022 0800 by Caryl Rene RN  Activity Management: activity adjusted per tolerance  Medication Review/Management: medications reviewed     Problem: Pain Chronic (Persistent) (Comorbidity Management)  Goal: Acceptable Pain Control and Functional Ability  Outcome: Ongoing, Progressing  Intervention: Manage Persistent Pain  Recent Flowsheet Documentation  Taken 5/4/2022 0800 by Caryl Rene RN  Medication Review/Management: medications reviewed  Intervention: Optimize Psychosocial Wellbeing  Recent Flowsheet Documentation  Taken 5/4/2022 0800 by Caryl Rene RN  Supportive Measures:   active listening utilized   verbalization of feelings encouraged  Diversional Activities: television  Family/Support System Care:   support provided   self-care encouraged     Problem: Seizure Disorder Comorbidity  Goal: Maintenance of Seizure Control  Outcome: Ongoing, Progressing  Intervention: Maintain Seizure-Symptom Control  Recent Flowsheet Documentation  Taken 5/4/2022 0800 by Caryl Rene RN  Seizure Precautions:   soft boundaries provided   clutter-free environment maintained   activity supervised     Problem: Skin Injury Risk Increased  Goal: Skin Health and Integrity  Outcome: Ongoing, Progressing  Intervention: Promote and Optimize Oral Intake  Recent Flowsheet Documentation  Taken 5/4/2022 0800 by Caryl Rene RN  Oral Nutrition Promotion: rest periods promoted  Intervention: Optimize Skin  Protection  Recent Flowsheet Documentation  Taken 5/4/2022 1600 by Caryl Rene RN  Head of Bed (HOB) Positioning: HOB at 30 degrees  Taken 5/4/2022 1400 by Caryl Rene RN  Head of Bed (HOB) Positioning: HOB at 45 degrees  Taken 5/4/2022 1200 by Caryl Rene RN  Head of Bed (HOB) Positioning: HOB at 30-45 degrees  Taken 5/4/2022 1000 by Caryl Rene RN  Head of Bed (HOB) Positioning: HOB at 20-30 degrees  Taken 5/4/2022 0800 by Caryl Rene RN  Pressure Reduction Techniques:   frequent weight shift encouraged   weight shift assistance provided  Head of Bed (HOB) Positioning: HOB at 30 degrees  Pressure Reduction Devices: pressure-redistributing mattress utilized  Skin Protection:   adhesive use limited   transparent dressing maintained   skin-to-device areas padded   protective footwear used     Problem: Pain Acute  Goal: Acceptable Pain Control and Functional Ability  Outcome: Ongoing, Progressing  Intervention: Prevent or Manage Pain  Recent Flowsheet Documentation  Taken 5/4/2022 0800 by Caryl Rene RN  Medication Review/Management: medications reviewed  Intervention: Optimize Psychosocial Wellbeing  Recent Flowsheet Documentation  Taken 5/4/2022 0800 by Caryl Rene RN  Supportive Measures:   active listening utilized   verbalization of feelings encouraged  Diversional Activities: television     Problem: Chest Pain  Goal: Resolution of Chest Pain Symptoms  Outcome: Ongoing, Progressing  Intervention: Manage Acute Chest Pain  Recent Flowsheet Documentation  Taken 5/4/2022 0800 by Caryl Rene RN  Chest Pain Intervention: cardiac monitoring continued     Problem: Impaired Wound Healing  Goal: Optimal Wound Healing  Outcome: Ongoing, Progressing  Intervention: Promote Wound Healing  Recent Flowsheet Documentation  Taken 5/4/2022 1600 by Caryl Rene RN  Activity Management: activity adjusted per tolerance  Taken 5/4/2022 1400 by Edgard  Caryl, LAZARA  Activity Management: activity adjusted per tolerance  Taken 5/4/2022 1200 by Caryl Rene, RN  Activity Management: activity adjusted per tolerance  Taken 5/4/2022 1000 by Caryl Rene RN  Activity Management: activity adjusted per tolerance  Taken 5/4/2022 0800 by Caryl Rene RN  Activity Management: activity adjusted per tolerance  Oral Nutrition Promotion: rest periods promoted     Problem: Fall Injury Risk  Goal: Absence of Fall and Fall-Related Injury  Outcome: Ongoing, Progressing  Intervention: Identify and Manage Contributors  Recent Flowsheet Documentation  Taken 5/4/2022 0800 by Caryl Rene, RN  Medication Review/Management: medications reviewed  Intervention: Promote Injury-Free Environment  Recent Flowsheet Documentation  Taken 5/4/2022 1600 by Caryl Rene, RN  Safety Promotion/Fall Prevention:   safety round/check completed   room organization consistent   clutter free environment maintained   assistive device/personal items within reach   activity supervised   fall prevention program maintained   nonskid shoes/slippers when out of bed  Taken 5/4/2022 1400 by Caryl Rene, RN  Safety Promotion/Fall Prevention:   safety round/check completed   room organization consistent   nonskid shoes/slippers when out of bed   clutter free environment maintained   assistive device/personal items within reach   activity supervised   fall prevention program maintained  Taken 5/4/2022 1200 by Caryl Rene, RN  Safety Promotion/Fall Prevention:   safety round/check completed   room organization consistent   nonskid shoes/slippers when out of bed   fall prevention program maintained   clutter free environment maintained   assistive device/personal items within reach   activity supervised  Taken 5/4/2022 1000 by Caryl Rene, RN  Safety Promotion/Fall Prevention:   safety round/check completed   room organization consistent   nonskid shoes/slippers  when out of bed   fall prevention program maintained   clutter free environment maintained   assistive device/personal items within reach   activity supervised  Taken 5/4/2022 0800 by Caryl Rene RN  Safety Promotion/Fall Prevention:   safety round/check completed   room organization consistent   nonskid shoes/slippers when out of bed   fall prevention program maintained   clutter free environment maintained   assistive device/personal items within reach   activity supervised

## 2022-05-04 NOTE — CASE MANAGEMENT/SOCIAL WORK
Continued Stay Note   Prem     Patient Name: Stephen Delgado  MRN: [id number]  Today's Date: 5/4/2022    Admit Date: 4/27/2022     Discharge Plan     Row Name 05/04/22 0749       Plan    Plan informed NP and RN's, of continue plan and needs for pt to d/c early in  am; pt is being transported early 5/5,  8 Am,  Dc summary will need to be faxed  [phone] by 1500  5/4, and nursing to call report by  1500 5/4  to [phone] [place];   Will need a COVID test prior to discharge  15:04 EDT  Per nurse called report and since pt had a covid test on 5/3, he does not need another for d/c on 5/5               Discharge Codes    No documentation.               Expected Discharge Date and Time     Expected Discharge Date Expected Discharge Time    May 5, 2022             Liz Tyson RN

## 2022-05-05 VITALS
RESPIRATION RATE: 17 BRPM | TEMPERATURE: 97.4 F | OXYGEN SATURATION: 95 % | BODY MASS INDEX: 15.83 KG/M2 | DIASTOLIC BLOOD PRESSURE: 70 MMHG | HEIGHT: 71 IN | HEART RATE: 83 BPM | SYSTOLIC BLOOD PRESSURE: 104 MMHG | WEIGHT: 113.1 LBS

## 2022-05-05 LAB — GLUCOSE BLDC GLUCOMTR-MCNC: 230 MG/DL (ref 70–130)

## 2022-05-05 PROCEDURE — 99217 PR OBSERVATION CARE DISCHARGE MANAGEMENT: CPT | Performed by: NURSE PRACTITIONER

## 2022-05-05 PROCEDURE — 63710000001 INSULIN REGULAR HUMAN PER 5 UNITS: Performed by: NURSE PRACTITIONER

## 2022-05-05 PROCEDURE — G0378 HOSPITAL OBSERVATION PER HR: HCPCS

## 2022-05-05 PROCEDURE — 82962 GLUCOSE BLOOD TEST: CPT

## 2022-05-05 RX ADMIN — HUMAN INSULIN 3 UNITS: 100 INJECTION, SOLUTION SUBCUTANEOUS at 06:43

## 2022-05-05 NOTE — DISCHARGE SUMMARY
[facility]   DISCHARGE SUMMARY      Name:  Stephen Delgado   Age:  31 y.o.  Sex:  male  :  1990  MRN:  [id number]   Visit Number:  [id number]    Admission Date:  2022  Date of Discharge:  2022  Primary Care Physician:  Caryl Mayer APRN    Important issues to note:    1.  Admitted to the hospital for LTC placement and reports of chest pain.    2.  Case management consulted on admission as well as social work.      3.  Patient has been accepted at [facility] in [place], [place].  Transportation has been arranged.      4.  Patient known to be brittle diabetic and very sensitive to insulin and any insulin adjustments.  Will need frequent blood glucose monitoring at facility.    5.  Stable for discharge to facility with transportation arranged for .    Discharge Diagnoses:     Type 1 Diabetes, uncontrolled  History of anoxic brain injury with seizures  Anxiety  Inability to complete ADLs independently  Neurogenic Hypertension  Muscle Spasticity  Malnutrition    Problem List:     Active Hospital Problems    Diagnosis  POA   • History of anoxic brain injury [Z86.69]  Not Applicable   • Impaired mobility and ADLs [Z74.09, Z78.9]  Yes   • Severe malnutrition (HCC) [E43]  Yes   • Chest pain in adult [R07.9]  Yes   • Gastro-esophageal reflux disease without esophagitis [K21.9]  Yes   • Type 1 diabetes mellitus with unspecified complications (HCC) [E10.8]  Yes   • Requires daily assistance for activities of daily living (ADL) and comfort needs [Z74.1]  Yes   • Underweight [R63.6]  Yes   • Neurogenic dysphagia [R13.19]  Yes   • 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     Presenting Problem:    Chief Complaint   Patient presents with   • Chest Pain      Consults:     Consulting Physician(s)             None          History of presenting  illness/Hospital Course:    Patient is a 31 year old male who presented to the emergency department via EMS 4/27/2022 with complaints of chest pain reported by family.  Patient with past health history of Type 1 Diabetes Mellitus with erratic blood sugars, anoxic brain injury, and anxiety with garbled speech at baseline.  His legal guardian is his brother who advised that patient complained of chest pain and overall not feeling well with erratic blood sugars.  Brother advised when arriving to the hospital that he was unable to care for him any longer and wanted long term care placement.  Prior anoxic brain injury was due to DKA and subsequent cardiac arrest, found down by brother and achieved ROSC, then coded again.  He ambulates at baseline and can alert others if his sugars are low, is able to urinate alone but needs assistance with bowel movements due to hand contractures.  Patient was doing well with home therapies until Covid. He has been unable to receive any therapy for approximately 1.5 years.  Prior to Covid patient could hold a spoon and feed himself.  Brother has been sole caregiver for the past 5 years.      Case management consulted on admission. Patient was evaluated by physical, occupational, and speech therapies.  Patient does try to communicate with speech but is quite difficult to understand.  Patient will shake his head no and raise head and shoulders for yes if given time to respond.  Patient's blood sugars remained erratic on admission. Will continue with current Levemir at discharge and sliding scale coverage with recommendations to titrate slowly given sensitivity to insulin therapy with type I diabetes.  Will continue with the rest of patient's home medications.  Patient accepted at nursing facility-[facility] in [place], [place].  Case management has arranged transportation for 5/5/2022.  Patient stable for discharge to long-term care.    Vital Signs:    Temp:  [97.4 °F (36.3  °C)-97.9 °F (36.6 °C)] 97.4 °F (36.3 °C)  Heart Rate:  [] 78  Resp:  [16-18] 18  BP: ()/(59-80) 99/68    Physical Exam:    General Appearance:  Alert and cooperative, young male, no acute distress on exam found resting in bed   Head:  Atraumatic and normocephalic.   Eyes: Conjunctivae and sclerae normal, no icterus. No pallor.   Ears:  Ears with no abnormalities noted.   Throat: No oral lesions, no thrush, oral mucosa moist.   Neck: Supple, trachea midline   Back:   No kyphoscoliosis present. No tenderness to palpation.   Lungs:   Breath sounds heard bilaterally equally.  No crackles or wheezing. Unlabored on room air   Heart:  Normal S1 and S2, RRR, no murmur, no gallop, no rub. No JVD.   Abdomen:   Normal bowel sounds, no masses, no organomegaly. Soft, nontender, nondistended, no rebound tenderness.   Extremities: Supple, no edema, no cyanosis, no clubbing, contractures to bilateral hands- extremity wasting is present.   Pulses: Pulses palpable bilaterally.   Skin: No bleeding or rash.   Neurologic:  Alert- nonverbal at baseline with garbled speech from anoxic brain injury. Some speech comprehensible at times, attempts to communicate with staff but very hard to understand. No facial asymmetry. Moves all four limbs. No tremors.     Pertinent Lab Results:     Results from last 7 days   Lab Units 05/04/22  0549 05/03/22  0538 05/02/22  0616 04/30/22  0555 04/29/22  0542   SODIUM mmol/L 132* 132* 137   < > 131*   POTASSIUM mmol/L 4.4 4.2 4.3   < > 5.4*   CHLORIDE mmol/L 93* 96* 99   < > 92*   CO2 mmol/L 27.0 24.6 30.8*   < > 24.9   BUN mg/dL 20 19 21*   < > 19   CREATININE mg/dL 0.79 0.73* 0.73*   < > 0.94   CALCIUM mg/dL 9.3 8.8 9.1   < > 10.0   BILIRUBIN mg/dL  --   --   --   --  1.7*   ALK PHOS U/L  --   --   --   --  88   ALT (SGPT) U/L  --   --   --   --  20   AST (SGOT) U/L  --   --   --   --  29   GLUCOSE mg/dL 353* 313* 331*   < > 342*    < > = values in this interval not displayed.     Results  from last 7 days   Lab Units 04/30/22  0555 04/29/22  0542   WBC 10*3/mm3 6.06 8.07   HEMOGLOBIN g/dL 15.9 18.1*   HEMATOCRIT % 45.7 51.7*   PLATELETS 10*3/mm3 234 162                                   Pertinent Radiology Results:    Imaging Results (All)     Procedure Component Value Units Date/Time    XR Chest 1 View [[id number]] Collected: 04/28/22 1042     Updated: 04/28/22 1044    Narrative:      PROCEDURE: XR CHEST 1 VW-     HISTORY: cp     COMPARISON: 2/25/2022.     FINDINGS: The heart is normal in size. The mediastinum is unremarkable.  The lungs are clear. There is no pneumothorax.  There are no acute  osseous abnormalities.       Impression:      No acute cardiopulmonary process.     Continued followup is recommended.     This report was signed and finalized on 4/28/2022 10:42 AM by Bailey Villegas M.D..        Condition on Discharge:      Stable.    Code status during the hospital stay:    Code Status and Medical Interventions:   Ordered at: 04/28/22 1422     Level Of Support Discussed With:    Next of Kin (If No Surrogate)     Code Status (Patient has no pulse and is not breathing):    CPR (Attempt to Resuscitate)     Medical Interventions (Patient has pulse or is breathing):    Full Support     Discharge Disposition:    [facility] [place]    Discharge Medications:       Discharge Medications      New Medications      Instructions Start Date   insulin detemir 100 UNIT/ML injection  Commonly known as: LEVEMIR   11 Units, Subcutaneous, Daily      insulin detemir 100 UNIT/ML injection  Commonly known as: LEVEMIR   11 Units, Subcutaneous, Daily      insulin regular 100 UNIT/ML injection  Commonly known as: humuLIN R,novoLIN R   0-7 Units, Subcutaneous, Every 6 Hours Scheduled         Changes to Medications      Instructions Start Date   citalopram 40 MG tablet  Commonly known as: CeleXA  What changed: how much to take   Take 1 tablet by mouth once daily      hydrOXYzine 25 MG  "tablet  Commonly known as: ATARAX  What changed:   · reasons to take this  · additional instructions   25 mg, Oral, 3 Times Daily PRN      levETIRAcetam 500 MG tablet  Commonly known as: KEPPRA  What changed: Another medication with the same name was removed. Continue taking this medication, and follow the directions you see here.   500 mg, Oral, 2 Times Daily      propranolol 20 MG tablet  Commonly known as: INDERAL  What changed: when to take this   Take 1 tablet by mouth twice daily         Continue These Medications      Instructions Start Date   baclofen 20 MG tablet  Commonly known as: LIORESAL   20 mg, Oral, 3 Times Daily      BD Pen Needle Shelly 2nd Gen 32G X 4 MM misc  Generic drug: Insulin Pen Needle   USE 1 PEN NEEDLE EVERY 3 HOURS WITH  INSULIN  PEN      FreeStyle Yanna 14 Day Sensor misc   1 each, Does not apply, Every 14 Days      glucose blood test strip  Commonly known as: FREESTYLE LITE   Test 20 times per day      Insulin Syringe 31G X 5/16\" 0.5 ML misc   1 Units, Does not apply, Every 3 Hours      trihexyphenidyl 2 MG tablet  Commonly known as: ARTANE   2 mg, Oral, 3 Times Daily With Meals         Stop These Medications    BASAGLAR KWIKPEN 100 UNIT/ML injection pen     insulin aspart 100 UNIT/ML solution pen-injector sc pen  Commonly known as: NovoLOG FlexPen          Discharge Diet:     Diet Instructions     Diet: Regular, Consistent Carbohydrate; Thin      Discharge Diet:  Regular  Consistent Carbohydrate       Fluid Consistency: Thin    Uses sippy cup, don't give bread, feeder, eats better laying back slightly.        Activity at Discharge:     Activity Instructions     Up WIth Assist          Follow-up Appointments:     Contact information for follow-up providers     Caryl Mayer APRN .    Specialty: Family Medicine  Contact information:  [address] [facility] [address] [phone]                   Contact information for after-discharge care     Destination     [facility] .    Service: Skilled Nursing  Contact information:  [address] [place] [id number] [phone]                           Future Appointments   Date Time Provider Department Center   5/5/2022  8:00 AM EMS 1 [facility]   6/30/2022  2:30 PM Noemi Godfrey MD [facility] Figueroa, JASWANT  05/05/22  07:26 EDT    Time: I spent 28 minutes on this discharge activity which included: face-to-face encounter with the patient, reviewing the data in the system, coordination of the care with the nursing staff as well as consultants, documentation, and entering orders.     Dictated utilizing Dragon dictation.

## 2022-05-05 NOTE — CASE MANAGEMENT/SOCIAL WORK
Case Management Discharge Note                Selected Continued Care - Admitted Since 4/27/2022     Destination Coordination complete.    Service Provider Selected Services Address Phone Fax Patient Preferred    [facility]  Skilled Nursing [address], [place] [address] [phone] --          Durable Medical Equipment    No services have been selected for the patient.              Dialysis/Infusion    No services have been selected for the patient.              Home Medical Care    No services have been selected for the patient.              Therapy    No services have been selected for the patient.              Community Resources    No services have been selected for the patient.              Community & DME    No services have been selected for the patient.                       Final Discharge Disposition Code: 64 - nursing facility under Medicaid

## 2022-05-05 NOTE — PLAN OF CARE
Goal Outcome Evaluation:              Outcome Evaluation: VSS  No acute events noted.  Plan to DC this AM to [facility].

## 2022-07-20 LAB
GLUCOSE BLDC GLUCOMTR-MCNC: 214 MG/DL (ref 70–130)
GLUCOSE BLDC GLUCOMTR-MCNC: 217 MG/DL (ref 70–130)

## 2024-05-04 ENCOUNTER — READMISSION MANAGEMENT (OUTPATIENT)
Dept: CALL CENTER | Facility: HOSPITAL | Age: 34
End: 2024-05-04
Payer: MEDICARE

## 2024-05-05 NOTE — OUTREACH NOTE
Prep Survey      Flowsheet Row Responses   [facility] facility patient discharged from? Non-BH   Is LACE score < 7 ? Non-BH Discharge   Eligibility Not Eligible   What are the reasons patient is not eligible? [facility]   Does the patient have one of the following disease processes/diagnoses(primary or secondary)? Other   Prep survey completed? Yes            ANNIE AVELAR - Registered Nurse

## 2024-07-10 ENCOUNTER — READMISSION MANAGEMENT (OUTPATIENT)
Dept: CALL CENTER | Facility: HOSPITAL | Age: 34
End: 2024-07-10
Payer: MEDICARE

## 2024-07-10 NOTE — OUTREACH NOTE
Prep Survey      Flowsheet Row Responses   [facility] facility patient discharged from? Non-BH   Is LACE score < 7 ? Non-BH Discharge   Eligibility Not Eligible   What are the reasons patient is not eligible? Other  [No PCP visit]   Does the patient have one of the following disease processes/diagnoses(primary or secondary)? Other   Prep survey completed? Yes            ANNIE AVELAR - Registered Nurse

## 2024-07-25 ENCOUNTER — READMISSION MANAGEMENT (OUTPATIENT)
Dept: CALL CENTER | Facility: HOSPITAL | Age: 34
End: 2024-07-25
Payer: MEDICARE

## 2024-07-26 NOTE — OUTREACH NOTE
Prep Survey      Flowsheet Row Responses   [facility] facility patient discharged from? Non-BH   Is LACE score < 7 ? Non-BH Discharge   Eligibility Not Eligible   What are the reasons patient is not eligible? Other  [doctor hasn't seen in over 3 years]   Does the patient have one of the following disease processes/diagnoses(primary or secondary)? Other   Prep survey completed? Yes            FRANCHESCA MCNEAL - Registered Nurse

## 2024-08-05 ENCOUNTER — READMISSION MANAGEMENT (OUTPATIENT)
Dept: CALL CENTER | Facility: HOSPITAL | Age: 34
End: 2024-08-05
Payer: MEDICARE

## 2024-08-06 NOTE — OUTREACH NOTE
Prep Survey      Flowsheet Row Responses   [facility] patient discharged from? Non-BH   Is LACE score < 7 ? Non-BH Discharge   Eligibility Not Eligible   What are the reasons patient is not eligible? Other  [> 3 yrs since PCP visit]   Does the patient have one of the following disease processes/diagnoses(primary or secondary)? Other   Prep survey completed? Yes            Glory Heard Registered Nurse

## 2024-09-25 ENCOUNTER — READMISSION MANAGEMENT (OUTPATIENT)
Dept: CALL CENTER | Facility: HOSPITAL | Age: 34
End: 2024-09-25
Payer: MEDICARE

## 2024-11-13 ENCOUNTER — READMISSION MANAGEMENT (OUTPATIENT)
Dept: CALL CENTER | Facility: HOSPITAL | Age: 34
End: 2024-11-13
Payer: MEDICARE

## 2024-11-14 NOTE — OUTREACH NOTE
Prep Survey      Flowsheet Row Responses   [facility] facility patient discharged from? Non-BH   Is LACE score < 7 ? Non-BH Discharge   Eligibility Not Eligible   What are the reasons patient is not eligible? Other   Does the patient have one of the following disease processes/diagnoses(primary or secondary)? Other   Prep survey completed? Yes            Tamar LAUREANO - Registered Nurse

## 2024-11-25 ENCOUNTER — READMISSION MANAGEMENT (OUTPATIENT)
Dept: CALL CENTER | Facility: HOSPITAL | Age: 34
End: 2024-11-25
Payer: MEDICARE

## 2024-11-26 NOTE — OUTREACH NOTE
Prep Survey      Flowsheet Row Responses   [facility] facility patient discharged from? Non-BH   Is LACE score < 7 ? Non-BH Discharge   Eligibility Not Eligible   What are the reasons patient is not eligible? Other   Does the patient have one of the following disease processes/diagnoses(primary or secondary)? Other   Prep survey completed? Yes            Tamar LAUREANO - Registered Nurse

## 2025-01-05 ENCOUNTER — READMISSION MANAGEMENT (OUTPATIENT)
Dept: CALL CENTER | Facility: HOSPITAL | Age: 35
End: 2025-01-05
Payer: MEDICARE

## 2025-01-05 NOTE — OUTREACH NOTE
Prep Survey      Flowsheet Row Responses   [facility] facility patient discharged from? Non-BH   Is LACE score < 7 ? Non-BH Discharge   Eligibility Not Eligible   What are the reasons patient is not eligible? Other  [no pcp appt in last 4 years]   Does the patient have one of the following disease processes/diagnoses(primary or secondary)? Other   Prep survey completed? Yes            PRIYANK CASTELLON - Registered Nurse

## 2025-02-17 ENCOUNTER — READMISSION MANAGEMENT (OUTPATIENT)
Dept: CALL CENTER | Facility: HOSPITAL | Age: 35
End: 2025-02-17
Payer: MEDICARE

## 2025-02-17 NOTE — OUTREACH NOTE
Prep Survey      Flowsheet Row Responses   [facility] facility patient discharged from? Non-BH   Is LACE score < 7 ? Non-BH Discharge   Eligibility Not Eligible   What are the reasons patient is not eligible? Other   Does the patient have one of the following disease processes/diagnoses(primary or secondary)? Other   Prep survey completed? Yes            Tamar LAUREANO - Registered Nurse